# Patient Record
Sex: MALE | Race: WHITE | NOT HISPANIC OR LATINO | URBAN - METROPOLITAN AREA
[De-identification: names, ages, dates, MRNs, and addresses within clinical notes are randomized per-mention and may not be internally consistent; named-entity substitution may affect disease eponyms.]

---

## 2017-02-04 ENCOUNTER — INPATIENT (INPATIENT)
Facility: HOSPITAL | Age: 72
LOS: 3 days | Discharge: HOME HEALTH | End: 2017-02-08
Attending: INTERNAL MEDICINE | Admitting: INTERNAL MEDICINE
Payer: MEDICARE

## 2017-02-04 VITALS
TEMPERATURE: 97 F | WEIGHT: 186.07 LBS | HEART RATE: 162 BPM | RESPIRATION RATE: 20 BRPM | DIASTOLIC BLOOD PRESSURE: 91 MMHG | SYSTOLIC BLOOD PRESSURE: 113 MMHG | OXYGEN SATURATION: 100 % | HEIGHT: 68 IN

## 2017-02-04 DIAGNOSIS — I27.82 CHRONIC PULMONARY EMBOLISM: ICD-10-CM

## 2017-02-04 DIAGNOSIS — I47.1 SUPRAVENTRICULAR TACHYCARDIA: ICD-10-CM

## 2017-02-04 DIAGNOSIS — C18.9 MALIGNANT NEOPLASM OF COLON, UNSPECIFIED: ICD-10-CM

## 2017-02-04 DIAGNOSIS — Z90.49 ACQUIRED ABSENCE OF OTHER SPECIFIED PARTS OF DIGESTIVE TRACT: Chronic | ICD-10-CM

## 2017-02-04 DIAGNOSIS — R73.9 HYPERGLYCEMIA, UNSPECIFIED: ICD-10-CM

## 2017-02-04 DIAGNOSIS — N17.9 ACUTE KIDNEY FAILURE, UNSPECIFIED: ICD-10-CM

## 2017-02-04 DIAGNOSIS — D72.829 ELEVATED WHITE BLOOD CELL COUNT, UNSPECIFIED: ICD-10-CM

## 2017-02-04 LAB
ALBUMIN SERPL ELPH-MCNC: 3.3 G/DL — SIGNIFICANT CHANGE UP (ref 3.3–5)
ALP SERPL-CCNC: 113 U/L — SIGNIFICANT CHANGE UP (ref 40–120)
ALT FLD-CCNC: 46 U/L — SIGNIFICANT CHANGE UP (ref 12–78)
ANION GAP SERPL CALC-SCNC: 12 MMOL/L — SIGNIFICANT CHANGE UP (ref 5–17)
APTT BLD: 32.4 SEC — SIGNIFICANT CHANGE UP (ref 27.5–37.4)
AST SERPL-CCNC: 29 U/L — SIGNIFICANT CHANGE UP (ref 15–37)
BASOPHILS # BLD AUTO: 0.1 K/UL — SIGNIFICANT CHANGE UP (ref 0–0.2)
BASOPHILS NFR BLD AUTO: 0.6 % — SIGNIFICANT CHANGE UP (ref 0–2)
BILIRUB SERPL-MCNC: 0.9 MG/DL — SIGNIFICANT CHANGE UP (ref 0.2–1.2)
BUN SERPL-MCNC: 25 MG/DL — HIGH (ref 7–23)
CALCIUM SERPL-MCNC: 8.6 MG/DL — SIGNIFICANT CHANGE UP (ref 8.5–10.1)
CHLORIDE SERPL-SCNC: 102 MMOL/L — SIGNIFICANT CHANGE UP (ref 96–108)
CK SERPL-CCNC: 104 U/L — SIGNIFICANT CHANGE UP (ref 26–308)
CO2 SERPL-SCNC: 27 MMOL/L — SIGNIFICANT CHANGE UP (ref 22–31)
CREAT SERPL-MCNC: 2.32 MG/DL — HIGH (ref 0.5–1.3)
EOSINOPHIL # BLD AUTO: 0.2 K/UL — SIGNIFICANT CHANGE UP (ref 0–0.5)
EOSINOPHIL NFR BLD AUTO: 0.9 % — SIGNIFICANT CHANGE UP (ref 0–6)
GLUCOSE SERPL-MCNC: 198 MG/DL — HIGH (ref 70–99)
HBA1C BLD-MCNC: 5.6 % — SIGNIFICANT CHANGE UP (ref 4–5.6)
HCT VFR BLD CALC: 34.2 % — LOW (ref 39–50)
HGB BLD-MCNC: 11.1 G/DL — LOW (ref 13–17)
INR BLD: 1.06 RATIO — SIGNIFICANT CHANGE UP (ref 0.88–1.16)
LYMPHOCYTES # BLD AUTO: 24.7 % — SIGNIFICANT CHANGE UP (ref 13–44)
LYMPHOCYTES # BLD AUTO: 4.4 K/UL — HIGH (ref 1–3.3)
MCHC RBC-ENTMCNC: 31.2 PG — SIGNIFICANT CHANGE UP (ref 27–34)
MCHC RBC-ENTMCNC: 32.5 GM/DL — SIGNIFICANT CHANGE UP (ref 32–36)
MCV RBC AUTO: 96 FL — SIGNIFICANT CHANGE UP (ref 80–100)
MONOCYTES # BLD AUTO: 1.4 K/UL — HIGH (ref 0–0.9)
MONOCYTES NFR BLD AUTO: 8 % — SIGNIFICANT CHANGE UP (ref 2–14)
NEUTROPHILS # BLD AUTO: 11.8 K/UL — HIGH (ref 1.8–7.4)
NEUTROPHILS NFR BLD AUTO: 65.8 % — SIGNIFICANT CHANGE UP (ref 43–77)
NT-PROBNP SERPL-SCNC: 572 PG/ML — HIGH (ref 0–125)
PLATELET # BLD AUTO: 273 K/UL — SIGNIFICANT CHANGE UP (ref 150–400)
POTASSIUM SERPL-MCNC: 4.2 MMOL/L — SIGNIFICANT CHANGE UP (ref 3.5–5.3)
POTASSIUM SERPL-SCNC: 4.2 MMOL/L — SIGNIFICANT CHANGE UP (ref 3.5–5.3)
PROT SERPL-MCNC: 8.3 GM/DL — SIGNIFICANT CHANGE UP (ref 6–8.3)
PROTHROM AB SERPL-ACNC: 11.7 SEC — SIGNIFICANT CHANGE UP (ref 10–13.1)
RBC # BLD: 3.56 M/UL — LOW (ref 4.2–5.8)
RBC # FLD: 16 % — HIGH (ref 10.3–14.5)
SODIUM SERPL-SCNC: 141 MMOL/L — SIGNIFICANT CHANGE UP (ref 135–145)
TROPONIN I SERPL-MCNC: 0.14 NG/ML — HIGH (ref 0.01–0.04)
TROPONIN I SERPL-MCNC: 0.22 NG/ML — HIGH (ref 0.01–0.04)
WBC # BLD: 18 K/UL — HIGH (ref 3.8–10.5)
WBC # FLD AUTO: 18 K/UL — HIGH (ref 3.8–10.5)

## 2017-02-04 PROCEDURE — 76700 US EXAM ABDOM COMPLETE: CPT | Mod: 26

## 2017-02-04 PROCEDURE — 99291 CRITICAL CARE FIRST HOUR: CPT

## 2017-02-04 PROCEDURE — 71010: CPT | Mod: 26

## 2017-02-04 PROCEDURE — 93010 ELECTROCARDIOGRAM REPORT: CPT

## 2017-02-04 RX ORDER — ONDANSETRON 8 MG/1
4 TABLET, FILM COATED ORAL EVERY 6 HOURS
Qty: 0 | Refills: 0 | Status: DISCONTINUED | OUTPATIENT
Start: 2017-02-04 | End: 2017-02-08

## 2017-02-04 RX ORDER — ACETAMINOPHEN 500 MG
650 TABLET ORAL EVERY 6 HOURS
Qty: 0 | Refills: 0 | Status: DISCONTINUED | OUTPATIENT
Start: 2017-02-04 | End: 2017-02-08

## 2017-02-04 RX ORDER — SODIUM CHLORIDE 9 MG/ML
1000 INJECTION INTRAMUSCULAR; INTRAVENOUS; SUBCUTANEOUS
Qty: 0 | Refills: 0 | Status: DISCONTINUED | OUTPATIENT
Start: 2017-02-04 | End: 2017-02-07

## 2017-02-04 RX ORDER — SIMVASTATIN 20 MG/1
20 TABLET, FILM COATED ORAL AT BEDTIME
Qty: 0 | Refills: 0 | Status: DISCONTINUED | OUTPATIENT
Start: 2017-02-04 | End: 2017-02-08

## 2017-02-04 RX ORDER — DEXTROAMPHETAMINE SACCHARATE, AMPHETAMINE ASPARTATE, DEXTROAMPHETAMINE SULFATE AND AMPHETAMINE SULFATE 1.875; 1.875; 1.875; 1.875 MG/1; MG/1; MG/1; MG/1
5 TABLET ORAL DAILY
Qty: 0 | Refills: 0 | Status: DISCONTINUED | OUTPATIENT
Start: 2017-02-04 | End: 2017-02-08

## 2017-02-04 RX ORDER — METOPROLOL TARTRATE 50 MG
25 TABLET ORAL DAILY
Qty: 0 | Refills: 0 | Status: DISCONTINUED | OUTPATIENT
Start: 2017-02-04 | End: 2017-02-04

## 2017-02-04 RX ORDER — DEXTROSE 50 % IN WATER 50 %
25 SYRINGE (ML) INTRAVENOUS ONCE
Qty: 0 | Refills: 0 | Status: DISCONTINUED | OUTPATIENT
Start: 2017-02-04 | End: 2017-02-05

## 2017-02-04 RX ORDER — INSULIN LISPRO 100/ML
VIAL (ML) SUBCUTANEOUS
Qty: 0 | Refills: 0 | Status: DISCONTINUED | OUTPATIENT
Start: 2017-02-04 | End: 2017-02-05

## 2017-02-04 RX ORDER — ENOXAPARIN SODIUM 100 MG/ML
130 INJECTION SUBCUTANEOUS ONCE
Qty: 0 | Refills: 0 | Status: COMPLETED | OUTPATIENT
Start: 2017-02-04 | End: 2017-02-04

## 2017-02-04 RX ORDER — METOPROLOL TARTRATE 50 MG
50 TABLET ORAL DAILY
Qty: 0 | Refills: 0 | Status: DISCONTINUED | OUTPATIENT
Start: 2017-02-04 | End: 2017-02-04

## 2017-02-04 RX ORDER — SODIUM CHLORIDE 9 MG/ML
3 INJECTION INTRAMUSCULAR; INTRAVENOUS; SUBCUTANEOUS ONCE
Qty: 0 | Refills: 0 | Status: COMPLETED | OUTPATIENT
Start: 2017-02-04 | End: 2017-02-04

## 2017-02-04 RX ORDER — DOCUSATE SODIUM 100 MG
100 CAPSULE ORAL THREE TIMES A DAY
Qty: 0 | Refills: 0 | Status: DISCONTINUED | OUTPATIENT
Start: 2017-02-04 | End: 2017-02-08

## 2017-02-04 RX ORDER — DEXTROSE 50 % IN WATER 50 %
1 SYRINGE (ML) INTRAVENOUS ONCE
Qty: 0 | Refills: 0 | Status: DISCONTINUED | OUTPATIENT
Start: 2017-02-04 | End: 2017-02-08

## 2017-02-04 RX ORDER — SODIUM CHLORIDE 9 MG/ML
1000 INJECTION, SOLUTION INTRAVENOUS
Qty: 0 | Refills: 0 | Status: DISCONTINUED | OUTPATIENT
Start: 2017-02-04 | End: 2017-02-05

## 2017-02-04 RX ORDER — GLUCAGON INJECTION, SOLUTION 0.5 MG/.1ML
1 INJECTION, SOLUTION SUBCUTANEOUS ONCE
Qty: 0 | Refills: 0 | Status: DISCONTINUED | OUTPATIENT
Start: 2017-02-04 | End: 2017-02-05

## 2017-02-04 RX ORDER — ASPIRIN/CALCIUM CARB/MAGNESIUM 324 MG
325 TABLET ORAL ONCE
Qty: 0 | Refills: 0 | Status: COMPLETED | OUTPATIENT
Start: 2017-02-04 | End: 2017-02-04

## 2017-02-04 RX ORDER — ENOXAPARIN SODIUM 100 MG/ML
80 INJECTION SUBCUTANEOUS EVERY 12 HOURS
Qty: 0 | Refills: 0 | Status: DISCONTINUED | OUTPATIENT
Start: 2017-02-04 | End: 2017-02-06

## 2017-02-04 RX ORDER — ADENOSINE 3 MG/ML
6 INJECTION INTRAVENOUS ONCE
Qty: 0 | Refills: 0 | Status: COMPLETED | OUTPATIENT
Start: 2017-02-04 | End: 2017-02-04

## 2017-02-04 RX ORDER — SENNA PLUS 8.6 MG/1
2 TABLET ORAL AT BEDTIME
Qty: 0 | Refills: 0 | Status: DISCONTINUED | OUTPATIENT
Start: 2017-02-04 | End: 2017-02-08

## 2017-02-04 RX ORDER — DEXTROSE 50 % IN WATER 50 %
12.5 SYRINGE (ML) INTRAVENOUS ONCE
Qty: 0 | Refills: 0 | Status: DISCONTINUED | OUTPATIENT
Start: 2017-02-04 | End: 2017-02-05

## 2017-02-04 RX ORDER — LATANOPROST 0.05 MG/ML
1 SOLUTION/ DROPS OPHTHALMIC; TOPICAL AT BEDTIME
Qty: 0 | Refills: 0 | Status: DISCONTINUED | OUTPATIENT
Start: 2017-02-04 | End: 2017-02-08

## 2017-02-04 RX ORDER — METOPROLOL TARTRATE 50 MG
25 TABLET ORAL DAILY
Qty: 0 | Refills: 0 | Status: DISCONTINUED | OUTPATIENT
Start: 2017-02-04 | End: 2017-02-05

## 2017-02-04 RX ADMIN — LATANOPROST 1 DROP(S): 0.05 SOLUTION/ DROPS OPHTHALMIC; TOPICAL at 23:05

## 2017-02-04 RX ADMIN — SIMVASTATIN 20 MILLIGRAM(S): 20 TABLET, FILM COATED ORAL at 23:05

## 2017-02-04 RX ADMIN — SODIUM CHLORIDE 3 MILLILITER(S): 9 INJECTION INTRAMUSCULAR; INTRAVENOUS; SUBCUTANEOUS at 14:49

## 2017-02-04 RX ADMIN — SODIUM CHLORIDE 80 MILLILITER(S): 9 INJECTION INTRAMUSCULAR; INTRAVENOUS; SUBCUTANEOUS at 22:09

## 2017-02-04 RX ADMIN — ENOXAPARIN SODIUM 130 MILLIGRAM(S): 100 INJECTION SUBCUTANEOUS at 14:15

## 2017-02-04 RX ADMIN — ADENOSINE 6 MILLIGRAM(S): 3 INJECTION INTRAVENOUS at 11:51

## 2017-02-04 RX ADMIN — Medication 325 MILLIGRAM(S): at 14:50

## 2017-02-04 NOTE — H&P ADULT - HISTORY OF PRESENT ILLNESS
72 y/o M PMHx colon ca, on chemo, Afib was on heart monitor for 1 month and was on metoprolol, PE  one month ago, presents to the ED c/o arms pain and dizziness when he tried to stand up. When he sat down the symptoms improved.  Alarmed, the wife summoned him to come to ER; here he was found in PSVT and responded well to adenosisne. Has evidence of ELIANA and he is on LMWH for VTE. Will be adm to tele. His psvt is probab sec to dehydration that caused RF; r/o obstruction ( less likely, normal micturition no abd pain).  Pt has a h/o stg IV colon ca s/p 2 resections and he is on chemo, last one 3 weeks ago. He is also in clinical trial with a " virus infusion"- Reolysin

## 2017-02-04 NOTE — ED ADULT NURSE NOTE - PMH
Colon cancer    Glaucoma of both eyes, unspecified glaucoma    HTN (hypertension)    Hypercholesterolemia

## 2017-02-04 NOTE — H&P ADULT - PROBLEM SELECTOR PLAN 1
- resolved with adenosine; probab sec to dehydration, baseline cr is 0.8 per daughter  - ivf and tele; c/w BB

## 2017-02-04 NOTE — ED PROVIDER NOTE - CARE PLAN
Principal Discharge DX:	SVT (supraventricular tachycardia)  Secondary Diagnosis:	Elevated troponin I level

## 2017-02-04 NOTE — H&P ADULT - NSHPPHYSICALEXAM_GEN_ALL_CORE
PHYSICAL EXAM:      Constitutional: well    ENMT: NC/AT PERRLA    Neck:supple no jvd    Breasts:deferred      Respiratory:CTA    Cardiovascular:S1S2 REG NO M/G/R    Gastrointestinal:SOFT, VENTRAL HERNIA     Genitourinary: DEFERRED    Rectal:DEFERRED    Extremities: NO EDEMA CLUBBING OR CYANOSIS    Neurological:MOT STR 5/5 ALL EXTR    Skin:DRY

## 2017-02-04 NOTE — ED PROVIDER NOTE - CRITICAL CARE PROVIDED
consult w/ pt's family directly relating to pts condition/documentation/direct patient care (not related to procedure)/additional history taking/interpretation of diagnostic studies

## 2017-02-04 NOTE — ED PROVIDER NOTE - PROGRESS NOTE DETAILS
The pt was given an adenosine push at 11:53, pt tolerated procedure well. HR and EKG were monitored. The pt was given an adenosine push at 11:53, pt tolerated procedure well. HR and EKG were monitored. Pt returned to NSR.

## 2017-02-04 NOTE — H&P ADULT - ASSESSMENT
72 y/o M PMHx colon ca, on chemo, Afib was on heart monitor for 1 month and was on metoprolol, PE  one month ago, presents to the ED c/o arms pain and dizziness when he tried to stand up

## 2017-02-04 NOTE — ED ADULT NURSE REASSESSMENT NOTE - NS ED NURSE REASSESS COMMENT FT1
Floor called for report and unable to take patient at time ([id number]). RN states she will call back.
Pt received from prior RN alert and oriented in sinus rhythm with report of feeling SOB and weak when standing. Pt already admin adenosine for SVT. Pt denies any chest pain, n/v, or pain at this time. Pt reports that at time of SOB/weakness he felt upper shoulder pain that has since resolved. Pt cont to be monitored and awaiting further dispo at this time.
Troponin 0.135
pt received. alert and oriented. no signs of respiratory distress. breathing even and unlabored. denies chest pain, SOB, nausea, vomiting at this time. resting comfortably. wife at bedside. will monitor.

## 2017-02-04 NOTE — H&P ADULT - NSHPLABSRESULTS_GEN_ALL_CORE
11.1   18.0  )-----------( 273      ( 04 Feb 2017 11:42 )             34.2   04 Feb 2017 11:42    141    |  102    |  25     ----------------------------<  198    4.2     |  27     |  2.32     Ca    8.6        04 Feb 2017 11:42    TPro  8.3    /  Alb  3.3    /  TBili  0.9    /  DBili  x      /  AST  29     /  ALT  46     /  AlkPhos  113    04 Feb 2017 11:42

## 2017-02-04 NOTE — ED PROVIDER NOTE - DETAILS:
I, Naomie Lockett, performed the initial face to face bedside interview with this patient regarding history of present illness, review of symptoms and relevant past medical, social and family history.  I completed an independent physical examination.  I was the initial provider who evaluated this patient. The history, relevant review of systems, past medical and surgical history, medical decision making, and physical examination was documented by the scribe in my presence and I attest to the accuracy of the documentation.

## 2017-02-04 NOTE — ED PROVIDER NOTE - OBJECTIVE STATEMENT
70 y/o M PMHx colon ca, on chemo, Afibb was on heart monitor for 1 month and was on metoprolol, presents to the ED c/o SOB. The pt provides the sob started 1 day ago. Pt took 2 81mg asa prior to coming to ED. PMD Dr. Valdovinos. No h/o cp, abd pain, nvd, fever, chills, dizziness or urinary incontinence. 72 y/o M PMHx colon ca, on chemo, Afibb was on heart monitor for 1 month and was on metoprolol, PE  one month ago, presents to the ED c/o SOB. The pt provides the sob started 1 day ago. Pt took 2 81mg asa prior to coming to ED. PMD Dr. Valdovinos. No h/o cp, abd pain, nvd, fever, chills, dizziness or urinary incontinence.

## 2017-02-04 NOTE — PATIENT PROFILE ADULT. - PMH
Colon cancer    Glaucoma of both eyes, unspecified glaucoma    HTN (hypertension)    Hypercholesterolemia    Other chronic pulmonary embolism

## 2017-02-05 DIAGNOSIS — I48.92 UNSPECIFIED ATRIAL FLUTTER: ICD-10-CM

## 2017-02-05 DIAGNOSIS — R79.89 OTHER SPECIFIED ABNORMAL FINDINGS OF BLOOD CHEMISTRY: ICD-10-CM

## 2017-02-05 LAB
ANION GAP SERPL CALC-SCNC: 7 MMOL/L — SIGNIFICANT CHANGE UP (ref 5–17)
BASOPHILS # BLD AUTO: 0.1 K/UL — SIGNIFICANT CHANGE UP (ref 0–0.2)
BASOPHILS NFR BLD AUTO: 0.4 % — SIGNIFICANT CHANGE UP (ref 0–2)
BUN SERPL-MCNC: 24 MG/DL — HIGH (ref 7–23)
CALCIUM SERPL-MCNC: 8.4 MG/DL — LOW (ref 8.5–10.1)
CHLORIDE SERPL-SCNC: 105 MMOL/L — SIGNIFICANT CHANGE UP (ref 96–108)
CO2 SERPL-SCNC: 29 MMOL/L — SIGNIFICANT CHANGE UP (ref 22–31)
CREAT SERPL-MCNC: 1.85 MG/DL — HIGH (ref 0.5–1.3)
EOSINOPHIL # BLD AUTO: 0.1 K/UL — SIGNIFICANT CHANGE UP (ref 0–0.5)
EOSINOPHIL NFR BLD AUTO: 0.8 % — SIGNIFICANT CHANGE UP (ref 0–6)
GLUCOSE SERPL-MCNC: 108 MG/DL — HIGH (ref 70–99)
HCT VFR BLD CALC: 27.7 % — LOW (ref 39–50)
HGB BLD-MCNC: 9.1 G/DL — LOW (ref 13–17)
LYMPHOCYTES # BLD AUTO: 13.4 % — SIGNIFICANT CHANGE UP (ref 13–44)
LYMPHOCYTES # BLD AUTO: 2.3 K/UL — SIGNIFICANT CHANGE UP (ref 1–3.3)
MCHC RBC-ENTMCNC: 31.4 PG — SIGNIFICANT CHANGE UP (ref 27–34)
MCHC RBC-ENTMCNC: 33 GM/DL — SIGNIFICANT CHANGE UP (ref 32–36)
MCV RBC AUTO: 95.1 FL — SIGNIFICANT CHANGE UP (ref 80–100)
MONOCYTES # BLD AUTO: 1.2 K/UL — HIGH (ref 0–0.9)
MONOCYTES NFR BLD AUTO: 7.1 % — SIGNIFICANT CHANGE UP (ref 2–14)
NEUTROPHILS # BLD AUTO: 13.4 K/UL — HIGH (ref 1.8–7.4)
NEUTROPHILS NFR BLD AUTO: 78.3 % — HIGH (ref 43–77)
PLATELET # BLD AUTO: 187 K/UL — SIGNIFICANT CHANGE UP (ref 150–400)
POTASSIUM SERPL-MCNC: 4.8 MMOL/L — SIGNIFICANT CHANGE UP (ref 3.5–5.3)
POTASSIUM SERPL-SCNC: 4.8 MMOL/L — SIGNIFICANT CHANGE UP (ref 3.5–5.3)
RBC # BLD: 2.91 M/UL — LOW (ref 4.2–5.8)
RBC # FLD: 15.8 % — HIGH (ref 10.3–14.5)
SODIUM SERPL-SCNC: 141 MMOL/L — SIGNIFICANT CHANGE UP (ref 135–145)
WBC # BLD: 17.1 K/UL — HIGH (ref 3.8–10.5)
WBC # FLD AUTO: 17.1 K/UL — HIGH (ref 3.8–10.5)

## 2017-02-05 PROCEDURE — 99223 1ST HOSP IP/OBS HIGH 75: CPT

## 2017-02-05 PROCEDURE — 93010 ELECTROCARDIOGRAM REPORT: CPT

## 2017-02-05 RX ORDER — AMIODARONE HYDROCHLORIDE 400 MG/1
400 TABLET ORAL ONCE
Qty: 0 | Refills: 0 | Status: COMPLETED | OUTPATIENT
Start: 2017-02-05 | End: 2017-02-05

## 2017-02-05 RX ORDER — AMIODARONE HYDROCHLORIDE 400 MG/1
400 TABLET ORAL EVERY 12 HOURS
Qty: 0 | Refills: 0 | Status: DISCONTINUED | OUTPATIENT
Start: 2017-02-05 | End: 2017-02-08

## 2017-02-05 RX ORDER — OXYCODONE HYDROCHLORIDE 5 MG/1
5 TABLET ORAL EVERY 4 HOURS
Qty: 0 | Refills: 0 | Status: DISCONTINUED | OUTPATIENT
Start: 2017-02-05 | End: 2017-02-08

## 2017-02-05 RX ADMIN — Medication 100 MILLIGRAM(S): at 21:04

## 2017-02-05 RX ADMIN — SODIUM CHLORIDE 80 MILLILITER(S): 9 INJECTION INTRAMUSCULAR; INTRAVENOUS; SUBCUTANEOUS at 18:14

## 2017-02-05 RX ADMIN — OXYCODONE HYDROCHLORIDE 5 MILLIGRAM(S): 5 TABLET ORAL at 10:41

## 2017-02-05 RX ADMIN — ENOXAPARIN SODIUM 80 MILLIGRAM(S): 100 INJECTION SUBCUTANEOUS at 05:00

## 2017-02-05 RX ADMIN — SIMVASTATIN 20 MILLIGRAM(S): 20 TABLET, FILM COATED ORAL at 21:04

## 2017-02-05 RX ADMIN — OXYCODONE HYDROCHLORIDE 5 MILLIGRAM(S): 5 TABLET ORAL at 22:15

## 2017-02-05 RX ADMIN — Medication 25 MILLIGRAM(S): at 05:01

## 2017-02-05 RX ADMIN — OXYCODONE HYDROCHLORIDE 5 MILLIGRAM(S): 5 TABLET ORAL at 15:28

## 2017-02-05 RX ADMIN — OXYCODONE HYDROCHLORIDE 5 MILLIGRAM(S): 5 TABLET ORAL at 21:03

## 2017-02-05 RX ADMIN — ENOXAPARIN SODIUM 80 MILLIGRAM(S): 100 INJECTION SUBCUTANEOUS at 18:14

## 2017-02-05 RX ADMIN — Medication 100 MILLIGRAM(S): at 13:11

## 2017-02-05 RX ADMIN — OXYCODONE HYDROCHLORIDE 5 MILLIGRAM(S): 5 TABLET ORAL at 11:22

## 2017-02-05 RX ADMIN — AMIODARONE HYDROCHLORIDE 400 MILLIGRAM(S): 400 TABLET ORAL at 15:29

## 2017-02-05 RX ADMIN — AMIODARONE HYDROCHLORIDE 400 MILLIGRAM(S): 400 TABLET ORAL at 21:04

## 2017-02-05 RX ADMIN — LATANOPROST 1 DROP(S): 0.05 SOLUTION/ DROPS OPHTHALMIC; TOPICAL at 21:10

## 2017-02-05 NOTE — CONSULT NOTE ADULT - SUBJECTIVE AND OBJECTIVE BOX
REQUESTING PHYSICIAN: Dr. Kang    REASON FOR CONSULT : Atrial fibrillation    CHIEF COMPLAINT: Fatigue and generalized weakness    HPI:  72 y/o M PMHx metastatic colon cancer (lung, liver) receiving chemotherapy ([facility] trial), perioperative atrial fibrillation several years ago with no known recurrence, recent PE & DVT (now anticoagulated with Lovenox) who presented to the ER yesterday with generalized weakness, fatigue, decreased functional status, dizziness, and bilateral shoulder pain. He was tachycardic upon arrival and treated with adenosine for suspected SVT.  He was also noted to have elevated BUN/Cr and was diagnosed with dehydration and administered IVF.  He has had multiple recurrences of AF/AFL since admission.  He denies: palpitations, angina, orthopnea, dyspnea.  Recent fall ~6 days ago; felt lightheaded and near-syncopal prior to admission.  Unable to identify exacerbating / alleviating factors.    PAST MEDICAL & SURGICAL HISTORY:  Other chronic pulmonary embolism  Colon cancer  HTN (hypertension)  Hypercholesterolemia  Glaucoma of both eyes, unspecified glaucoma  History of colon resection    SOCIAL HISTORY: Alochol: Denied  Smoking: Nonsmoker  Drug Use: Denied  Marital Status:     FAMILY HISTORY: FAMILY HISTORY:  No pertinent family history in first degree relatives    MEDICATIONS  (STANDING):  sodium chloride 0.9%. 1000milliLiter(s) IV Continuous <Continuous>  docusate sodium 100milliGRAM(s) Oral three times a day  enoxaparin Injectable 80milliGRAM(s) SubCutaneous every 12 hours  simvastatin 20milliGRAM(s) Oral at bedtime  amphetamine/dextroamphetamine 5milliGRAM(s) Oral daily  latanoprost 0.005% Ophthalmic Solution 1Drop(s) Both EYES at bedtime  amiodarone    Tablet 400milliGRAM(s) Oral once  amiodarone    Tablet 400milliGRAM(s) Oral every 12 hours    MEDICATIONS  (PRN):  acetaminophen   Tablet 650milliGRAM(s) Oral every 6 hours PRN pain,fever  ondansetron Injectable 4milliGRAM(s) IV Push every 6 hours PRN Nausea  aluminum hydroxide/magnesium hydroxide/simethicone Suspension 30milliLiter(s) Oral every 4 hours PRN Dyspepsia  senna 2Tablet(s) Oral at bedtime PRN Constipation  dextrose Gel 1Dose(s) Oral once PRN Blood Glucose LESS THAN 70 milliGRAM(s)/deciliter  oxyCODONE IR 5milliGRAM(s) Oral every 4 hours PRN Moderate Pain (4 - 6)      ALLERGIES: penicillins (Rash)    REVIEW OF SYSTEMS:    CONSTITUTIONAL: Weakness, no fevers or chills  EYES/ENT: No visual changes;  No vertigo or throat pain   NECK: No pain or stiffness  RESPIRATORY: No cough, wheezing, hemoptysis; No shortness of breath  CARDIOVASCULAR: No chest pain or palpitations  GASTROINTESTINAL: No abdominal pain. No nausea, vomiting, or hematemesis; No diarrhea or constipation. No melena or hematochezia.  GENITOURINARY: No dysuria, frequency or hematuria  NEUROLOGICAL: Dizziness, lightheadedness. No numbness or weakness  SKIN: No itching or rash  All other review of systems is negative unless indicated above    VITAL SIGNS:   Vital Signs Last 24 Hrs  T(C): 37.1, Max: 37.1 (02-05 @ 11:31)  T(F): 98.8, Max: 98.8 (02-05 @ 11:31)  HR: 73 (73 - 85)  BP: 135/85 (127/83 - 150/80)  BP(mean): --  RR: 17 (17 - 18)  SpO2: 98% (98% - 100%)    PHYSICAL EXAM:    Constitutional: NAD, awake and alert, well-developed. Seated in bedside chair.  Eyes:  EOMI,  Pupils round,   Pulmonary: Non-labored, breath sounds are clear bilaterally, No wheezing, rales or rhonchi  Cardiovascular: Irregular, tachycardic. Normal S2.  Gastrointestinal: Abdomen is obese. Bowel Sounds present, soft, nontender.   Lymph: No peripheral edema. No cervical lymphadenopathy.  Neurological: Alert, no focal deficits  Skin: No rashes.  Psych:  Mood & affect appropriate    LABS: All Labs Reviewed:                        9.1    17.1  )-----------( 187      ( 05 Feb 2017 05:46 )             27.7                         11.1   18.0  )-----------( 273      ( 04 Feb 2017 11:42 )             34.2     05 Feb 2017 05:46    141    |  105    |  24     ----------------------------<  108    4.8     |  29     |  1.85   04 Feb 2017 11:42    141    |  102    |  25     ----------------------------<  198    4.2     |  27     |  2.32     Ca    8.4        05 Feb 2017 05:46  Ca    8.6        04 Feb 2017 11:42    TPro  8.3    /  Alb  3.3    /  TBili  0.9    /  DBili  x      /  AST  29     /  ALT  46     /  AlkPhos  113    04 Feb 2017 11:42    PT/INR - ( 04 Feb 2017 11:42 )   PT: 11.7 sec;   INR: 1.06 ratio         PTT - ( 04 Feb 2017 11:42 )  PTT:32.4 sec  CARDIAC MARKERS ( 04 Feb 2017 17:09 )  0.221 ng/mL / x     / x     / x     / x      CARDIAC MARKERS ( 04 Feb 2017 11:42 )  0.135 ng/mL / x     / 104 U/L / x     / x        Pro Bnp 572    EKG (multiple ECGs reviewed - most recent): Normal sinus rhythm, Left axis deviation early R wave transition. Nonspecific ST abnormality

## 2017-02-05 NOTE — CONSULT NOTE ADULT - PROBLEM SELECTOR RECOMMENDATION 9
Paroxysmal atrial fluter / fibrillation with multiple recurrences associated with rapid ventricular response.  I discussed management with his outpatient electrophysiologist (Dr. William Beauchamp) - will load with oral amiodarone in effort to maintain sinus rhythm (I discussed indications, benefits, risks [including toxicity], alternatives with patient and wife).  There is a history of sinus bradycardia so will d/c metoprolol.

## 2017-02-06 LAB
ABO RH CONFIRMATION: SIGNIFICANT CHANGE UP
ANION GAP SERPL CALC-SCNC: 8 MMOL/L — SIGNIFICANT CHANGE UP (ref 5–17)
ANISOCYTOSIS BLD QL: SLIGHT — SIGNIFICANT CHANGE UP
BASOPHILS # BLD AUTO: 0.1 K/UL — SIGNIFICANT CHANGE UP (ref 0–0.2)
BASOPHILS NFR BLD AUTO: 0.6 % — SIGNIFICANT CHANGE UP (ref 0–2)
BLD GP AB SCN SERPL QL: SIGNIFICANT CHANGE UP
BUN SERPL-MCNC: 20 MG/DL — SIGNIFICANT CHANGE UP (ref 7–23)
CALCIUM SERPL-MCNC: 8.3 MG/DL — LOW (ref 8.5–10.1)
CHLORIDE SERPL-SCNC: 105 MMOL/L — SIGNIFICANT CHANGE UP (ref 96–108)
CO2 SERPL-SCNC: 27 MMOL/L — SIGNIFICANT CHANGE UP (ref 22–31)
CREAT SERPL-MCNC: 1.73 MG/DL — HIGH (ref 0.5–1.3)
DACRYOCYTES BLD QL SMEAR: SLIGHT — SIGNIFICANT CHANGE UP
EOSINOPHIL # BLD AUTO: 0 K/UL — SIGNIFICANT CHANGE UP (ref 0–0.5)
EOSINOPHIL NFR BLD AUTO: 0.4 % — SIGNIFICANT CHANGE UP (ref 0–6)
GLUCOSE SERPL-MCNC: 100 MG/DL — HIGH (ref 70–99)
HCT VFR BLD CALC: 23.6 % — LOW (ref 39–50)
HGB BLD-MCNC: 7.9 G/DL — LOW (ref 13–17)
HYPOCHROMIA BLD QL: SLIGHT — SIGNIFICANT CHANGE UP
LYMPHOCYTES # BLD AUTO: 16 % — SIGNIFICANT CHANGE UP (ref 13–44)
LYMPHOCYTES # BLD AUTO: 2.1 K/UL — SIGNIFICANT CHANGE UP (ref 1–3.3)
MACROCYTES BLD QL: SLIGHT — SIGNIFICANT CHANGE UP
MANUAL DIF COMMENT BLD-IMP: SIGNIFICANT CHANGE UP
MCHC RBC-ENTMCNC: 31.6 PG — SIGNIFICANT CHANGE UP (ref 27–34)
MCHC RBC-ENTMCNC: 33.3 GM/DL — SIGNIFICANT CHANGE UP (ref 32–36)
MCV RBC AUTO: 95 FL — SIGNIFICANT CHANGE UP (ref 80–100)
MICROCYTES BLD QL: SLIGHT — SIGNIFICANT CHANGE UP
MONOCYTES # BLD AUTO: 1.4 K/UL — HIGH (ref 0–0.9)
MONOCYTES NFR BLD AUTO: 10.4 % — SIGNIFICANT CHANGE UP (ref 2–14)
NEUTROPHILS # BLD AUTO: 9.4 K/UL — HIGH (ref 1.8–7.4)
NEUTROPHILS NFR BLD AUTO: 72.7 % — SIGNIFICANT CHANGE UP (ref 43–77)
PLAT MORPH BLD: NORMAL — SIGNIFICANT CHANGE UP
PLATELET # BLD AUTO: 148 K/UL — LOW (ref 150–400)
POIKILOCYTOSIS BLD QL AUTO: SLIGHT — SIGNIFICANT CHANGE UP
POTASSIUM SERPL-MCNC: 4.6 MMOL/L — SIGNIFICANT CHANGE UP (ref 3.5–5.3)
POTASSIUM SERPL-SCNC: 4.6 MMOL/L — SIGNIFICANT CHANGE UP (ref 3.5–5.3)
RBC # BLD: 2.49 M/UL — LOW (ref 4.2–5.8)
RBC # FLD: 16.3 % — HIGH (ref 10.3–14.5)
RBC BLD AUTO: (no result)
SODIUM SERPL-SCNC: 140 MMOL/L — SIGNIFICANT CHANGE UP (ref 135–145)
SPHEROCYTES BLD QL SMEAR: SLIGHT — SIGNIFICANT CHANGE UP
TYPE + AB SCN PNL BLD: SIGNIFICANT CHANGE UP
WBC # BLD: 13 K/UL — HIGH (ref 3.8–10.5)
WBC # FLD AUTO: 13 K/UL — HIGH (ref 3.8–10.5)

## 2017-02-06 PROCEDURE — 73502 X-RAY EXAM HIP UNI 2-3 VIEWS: CPT | Mod: 26

## 2017-02-06 PROCEDURE — 37191 INS ENDOVAS VENA CAVA FILTR: CPT

## 2017-02-06 PROCEDURE — 76377 3D RENDER W/INTRP POSTPROCES: CPT | Mod: 26

## 2017-02-06 PROCEDURE — 72192 CT PELVIS W/O DYE: CPT | Mod: 26

## 2017-02-06 PROCEDURE — 99233 SBSQ HOSP IP/OBS HIGH 50: CPT

## 2017-02-06 RX ORDER — MORPHINE SULFATE 50 MG/1
4 CAPSULE, EXTENDED RELEASE ORAL EVERY 4 HOURS
Qty: 0 | Refills: 0 | Status: DISCONTINUED | OUTPATIENT
Start: 2017-02-06 | End: 2017-02-08

## 2017-02-06 RX ADMIN — MORPHINE SULFATE 4 MILLIGRAM(S): 50 CAPSULE, EXTENDED RELEASE ORAL at 20:36

## 2017-02-06 RX ADMIN — SIMVASTATIN 20 MILLIGRAM(S): 20 TABLET, FILM COATED ORAL at 21:07

## 2017-02-06 RX ADMIN — ENOXAPARIN SODIUM 80 MILLIGRAM(S): 100 INJECTION SUBCUTANEOUS at 06:06

## 2017-02-06 RX ADMIN — MORPHINE SULFATE 4 MILLIGRAM(S): 50 CAPSULE, EXTENDED RELEASE ORAL at 20:55

## 2017-02-06 RX ADMIN — MORPHINE SULFATE 4 MILLIGRAM(S): 50 CAPSULE, EXTENDED RELEASE ORAL at 15:36

## 2017-02-06 RX ADMIN — DEXTROAMPHETAMINE SACCHARATE, AMPHETAMINE ASPARTATE, DEXTROAMPHETAMINE SULFATE AND AMPHETAMINE SULFATE 5 MILLIGRAM(S): 1.875; 1.875; 1.875; 1.875 TABLET ORAL at 12:11

## 2017-02-06 RX ADMIN — Medication 100 MILLIGRAM(S): at 06:06

## 2017-02-06 RX ADMIN — LATANOPROST 1 DROP(S): 0.05 SOLUTION/ DROPS OPHTHALMIC; TOPICAL at 21:07

## 2017-02-06 RX ADMIN — OXYCODONE HYDROCHLORIDE 5 MILLIGRAM(S): 5 TABLET ORAL at 14:35

## 2017-02-06 RX ADMIN — AMIODARONE HYDROCHLORIDE 400 MILLIGRAM(S): 400 TABLET ORAL at 18:12

## 2017-02-06 RX ADMIN — OXYCODONE HYDROCHLORIDE 5 MILLIGRAM(S): 5 TABLET ORAL at 06:16

## 2017-02-06 RX ADMIN — AMIODARONE HYDROCHLORIDE 400 MILLIGRAM(S): 400 TABLET ORAL at 06:06

## 2017-02-07 DIAGNOSIS — S80.12XA CONTUSION OF LEFT LOWER LEG, INITIAL ENCOUNTER: ICD-10-CM

## 2017-02-07 DIAGNOSIS — I10 ESSENTIAL (PRIMARY) HYPERTENSION: ICD-10-CM

## 2017-02-07 DIAGNOSIS — R79.89 OTHER SPECIFIED ABNORMAL FINDINGS OF BLOOD CHEMISTRY: ICD-10-CM

## 2017-02-07 LAB
HBA1C BLD-MCNC: 5.5 % — SIGNIFICANT CHANGE UP (ref 4–5.6)
HCT VFR BLD CALC: 25.8 % — LOW (ref 39–50)
HCT VFR BLD CALC: 26 % — LOW (ref 39–50)
HGB BLD-MCNC: 8.5 G/DL — LOW (ref 13–17)
HGB BLD-MCNC: 8.8 G/DL — LOW (ref 13–17)
MCHC RBC-ENTMCNC: 31.2 PG — SIGNIFICANT CHANGE UP (ref 27–34)
MCHC RBC-ENTMCNC: 33.1 GM/DL — SIGNIFICANT CHANGE UP (ref 32–36)
MCV RBC AUTO: 94.3 FL — SIGNIFICANT CHANGE UP (ref 80–100)
PLATELET # BLD AUTO: 134 K/UL — LOW (ref 150–400)
RBC # BLD: 2.74 M/UL — LOW (ref 4.2–5.8)
RBC # FLD: 16 % — HIGH (ref 10.3–14.5)
WBC # BLD: 12.8 K/UL — HIGH (ref 3.8–10.5)
WBC # FLD AUTO: 12.8 K/UL — HIGH (ref 3.8–10.5)

## 2017-02-07 PROCEDURE — 99233 SBSQ HOSP IP/OBS HIGH 50: CPT

## 2017-02-07 RX ADMIN — MORPHINE SULFATE 4 MILLIGRAM(S): 50 CAPSULE, EXTENDED RELEASE ORAL at 21:14

## 2017-02-07 RX ADMIN — MORPHINE SULFATE 4 MILLIGRAM(S): 50 CAPSULE, EXTENDED RELEASE ORAL at 13:29

## 2017-02-07 RX ADMIN — MORPHINE SULFATE 4 MILLIGRAM(S): 50 CAPSULE, EXTENDED RELEASE ORAL at 09:07

## 2017-02-07 RX ADMIN — AMIODARONE HYDROCHLORIDE 400 MILLIGRAM(S): 400 TABLET ORAL at 05:05

## 2017-02-07 RX ADMIN — Medication 100 MILLIGRAM(S): at 13:29

## 2017-02-07 RX ADMIN — MORPHINE SULFATE 4 MILLIGRAM(S): 50 CAPSULE, EXTENDED RELEASE ORAL at 16:53

## 2017-02-07 RX ADMIN — DEXTROAMPHETAMINE SACCHARATE, AMPHETAMINE ASPARTATE, DEXTROAMPHETAMINE SULFATE AND AMPHETAMINE SULFATE 5 MILLIGRAM(S): 1.875; 1.875; 1.875; 1.875 TABLET ORAL at 13:29

## 2017-02-07 RX ADMIN — AMIODARONE HYDROCHLORIDE 400 MILLIGRAM(S): 400 TABLET ORAL at 17:33

## 2017-02-07 RX ADMIN — MORPHINE SULFATE 4 MILLIGRAM(S): 50 CAPSULE, EXTENDED RELEASE ORAL at 13:15

## 2017-02-07 RX ADMIN — SIMVASTATIN 20 MILLIGRAM(S): 20 TABLET, FILM COATED ORAL at 21:04

## 2017-02-07 RX ADMIN — LATANOPROST 1 DROP(S): 0.05 SOLUTION/ DROPS OPHTHALMIC; TOPICAL at 21:04

## 2017-02-07 RX ADMIN — Medication 100 MILLIGRAM(S): at 21:04

## 2017-02-07 NOTE — DISCHARGE NOTE ADULT - CARE PLAN
Principal Discharge DX:	SVT (supraventricular tachycardia)  Goal:	medical management.  Instructions for follow-up, activity and diet:	Follow up with your EP doctor on Friday.  Continue amiodarone as prescribed until then.  Continue metoprolol 25mg daily as well.  Secondary Diagnosis:	ELIANA (acute kidney injury)  Goal:	improving.  Instructions for follow-up, activity and diet:	Encourage oral hydration.  follow up with your pcp.  Secondary Diagnosis:	Essential hypertension  Goal:	med management.  Instructions for follow-up, activity and diet:	follow up with your pcp.  Secondary Diagnosis:	Malignant neoplasm of colon, unspecified part of colon  Goal:	continue chemo per your oncologist.  Instructions for follow-up, activity and diet:	follow up with your oncologist.  Secondary Diagnosis:	Hypercholesterolemia  Goal:	continue with simvastatin.  Instructions for follow-up, activity and diet:	follow up with your pcp.  Secondary Diagnosis:	Other chronic pulmonary embolism  Goal:	hold off on all anticoagulation.  You are still anemic.  Gluteal hematoma will take time to reabsorb. You will need close outpatient follow up.  Instructions for follow-up, activity and diet:	follow up with your pcp.  Secondary Diagnosis:	Atrial flutter, unspecified type  Goal:	medical management as above.  Instructions for follow-up, activity and diet:	follow up with your cardiologist.

## 2017-02-07 NOTE — DISCHARGE NOTE ADULT - MEDICATION SUMMARY - MEDICATIONS TO STOP TAKING
I will STOP taking the medications listed below when I get home from the hospital:    Lovenox  --  injectable

## 2017-02-07 NOTE — PHYSICAL THERAPY INITIAL EVALUATION ADULT - DIAGNOSIS, PT EVAL
Arm pain, dizziness. Elevated troponins. Hematomas left gluteus marce and medius. Arm pain, dizziness. Elevated troponins. A-flutter, hematomas left gluteus marce and medius.

## 2017-02-07 NOTE — DISCHARGE NOTE ADULT - MEDICATION SUMMARY - MEDICATIONS TO TAKE
I will START or STAY ON the medications listed below when I get home from the hospital:    oxyCODONE 5 mg oral tablet  -- 1 tab(s) by mouth every 4 hours, As needed, Moderate Pain (4 - 6) -for severe pain MDD:30  -- Indication: For for pain.    amiodarone 200 mg oral tablet  -- 2 tab(s) by mouth every 12 hours for 6 doses.  Then take 200mg daily until follow up with your physician.  -- Indication: For SVT (supraventricular tachycardia)/atrial flutter.    simvastatin  --  by mouth   -- Indication: For Hypercholesterolemia    metoprolol succinate 25 mg oral tablet, extended release  -- 1 tab(s) by mouth once a day  -- Indication: For SVT (supraventricular tachycardia)    amphetamine-dextroamphetamine 5 mg oral tablet  -- 1 tab(s) by mouth once a day  -- Indication: For resume previous home medication    Lumigan  --  to each affected eye   -- Indication: For resume previous home medication.

## 2017-02-07 NOTE — PROGRESS NOTE ADULT - PROBLEM SELECTOR PLAN 4
- repeat cbc in am; pt was on steroids recently; no signs of infection
Resolving and improving.
- stg IV see hpi

## 2017-02-07 NOTE — DISCHARGE NOTE ADULT - SECONDARY DIAGNOSIS.
ELIANA (acute kidney injury) Essential hypertension Malignant neoplasm of colon, unspecified part of colon Hypercholesterolemia Other chronic pulmonary embolism Atrial flutter, unspecified type

## 2017-02-07 NOTE — PROGRESS NOTE ADULT - PROBLEM SELECTOR PLAN 5
- check a1c; sliding scale
Mildly elevated and will be adding diltazem for afib/flutter/svt so hopefulyy this will control BP as well.
- check a1c; pending a1c

## 2017-02-07 NOTE — DISCHARGE NOTE ADULT - CARE PROVIDER_API CALL
your electrophysiologist.,   Phone: (   )    -  Fax: (   )    -    your pcp,   Phone: (   )    -  Fax: (   )    -

## 2017-02-07 NOTE — PROGRESS NOTE ADULT - ASSESSMENT
72 y/o M PMHx colon ca, on chemo, Afib was on heart monitor for 1 month and was on metoprolol, PE  one month ago, presents to the ED c/o arms pain and dizziness when he tried to stand up
70 y/o M PMHx colon ca, on chemo, Afib was on heart monitor for 1 month and was on metoprolol, PE  one month ago, presents to the ED c/o arms pain and dizziness when he tried to stand up
HX of DVT PE  oct 2016   on lovenox  , which will be held  , until the acute anemia evaluation is done , discussed with dr Smallwood ,  wife at bed side ,

## 2017-02-07 NOTE — DISCHARGE NOTE ADULT - PROVIDER TOKENS
FREE:[LAST:[your electrophysiologist.],PHONE:[(   )    -],FAX:[(   )    -]],FREE:[LAST:[your pcp],PHONE:[(   )    -],FAX:[(   )    -]]

## 2017-02-07 NOTE — PHYSICAL THERAPY INITIAL EVALUATION ADULT - PERTINENT HX OF CURRENT PROBLEM, REHAB EVAL
Pt admitted to  secondary to arm pain and dizziness. H/H-8.8/26.0, troponins: 2/4- 0.135, 0.221. Left hip x-ray: neg. CT pelvis: Mod-large hematoma gluteus marce. Min-mod hematoma gluteus medius.

## 2017-02-07 NOTE — DISCHARGE NOTE ADULT - HOSPITAL COURSE
70 y/o M PMHx colon ca, on chemo, Afib was on heart monitor for 1 month and was on metoprolol, PE  one month ago, presents to the ED c/o arms pain and dizziness when he tried to stand up. When he sat down the symptoms improved.  Alarmed, the wife summoned him to come to ER; here he was found in PSVT and responded well to adenosisne. Has evidence of ELIANA and he is on LMWH for VTE. Will be adm to tele. His psvt is probab sec to dehydration that caused RF; r/o obstruction ( less likely, normal micturition no abd pain).  Pt has a h/o stg IV colon ca s/p 2 resections and he is on chemo, last one 3 weeks ago. He is also in clinical trial with a " virus infusion"- Reolysin.      Pt was started on amio loading per cardiology.  Currently HR sinus 60-80s.  He denies any palpitations, CP, N, V.  Pt has been complaining about buttock pain and it was discovered he has a gluteal hematoma.  H+H has been stable.  Pt eager to go home with outpatient follow up.      REVIEW OF SYSTEMS:    CONSTITUTIONAL: No weakness, fevers or chills, + gluteal pain  EYES/ENT: No visual changes;  No vertigo or throat pain   NECK: No pain or stiffness  RESPIRATORY: No cough, wheezing, hemoptysis; No shortness of breath  CARDIOVASCULAR: No chest pain or palpitations  GASTROINTESTINAL: No abdominal or epigastric pain. No nausea, vomiting, or hematemesis; No diarrhea or constipation. No melena or hematochezia.  GENITOURINARY: No dysuria, frequency or hematuria  NEUROLOGICAL: No numbness or weakness  SKIN: No itching, burning, rashes, or lesions   All other review of systems is negative unless indicated above.    Vital Signs Last 24 Hrs  T(C): 37.2, Max: 37.2 (02-08 @ 10:58)  T(F): 98.9, Max: 98.9 (02-08 @ 10:58)  HR: 70 (70 - 89)  BP: 133/78 (133/78 - 141/92)  BP(mean): --  RR: 16 (16 - 16)  SpO2: 98% (94% - 98%)    PHYSICAL EXAM:    Constitutional: NAD, awake and alert, well-developed  HEENT: PERR, EOMI, Normal Hearing, MMM  Neck: Soft and supple, No LAD, No JVD  Respiratory: Breath sounds are clear bilaterally, No wheezing, rales or rhonchi  Cardiovascular: S1 and S2, regular rate and rhythm, no Murmurs, gallops or rubs  Gastrointestinal: Bowel Sounds present, soft, nontender, nondistended, no guarding, no rebound  Extremities: No peripheral edema  Vascular: 2+ peripheral pulses  Neurological: A/O x 3, no focal deficits  Musculoskeletal: 5/5 strength b/l upper and lower extremities  Skin: No rashes

## 2017-02-07 NOTE — PROGRESS NOTE ADULT - PROBLEM SELECTOR PROBLEM 4
Leukocytosis, unspecified type
ELIANA (acute kidney injury)
Malignant neoplasm of colon, unspecified part of colon

## 2017-02-07 NOTE — DISCHARGE NOTE ADULT - DURABLE MEDICAL EQUIPMENT AGENCY
[facility] [phone], RW [facility] [phone], RW provided to pt at bedside.

## 2017-02-07 NOTE — DISCHARGE NOTE ADULT - PLAN OF CARE
medical management. Follow up with your EP doctor on Friday.  Continue amiodarone as prescribed until then.  Continue metoprolol 25mg daily as well. improving. Encourage oral hydration.  follow up with your pcp. med management. follow up with your pcp. continue chemo per your oncologist. follow up with your oncologist. continue with simvastatin. hold off on all anticoagulation.  You are still anemic.  Gluteal hematoma will take time to reabsorb. You will need close outpatient follow up. medical management as above. follow up with your cardiologist.

## 2017-02-08 VITALS
OXYGEN SATURATION: 98 % | TEMPERATURE: 99 F | HEART RATE: 70 BPM | DIASTOLIC BLOOD PRESSURE: 78 MMHG | SYSTOLIC BLOOD PRESSURE: 133 MMHG | RESPIRATION RATE: 16 BRPM

## 2017-02-08 LAB
HCT VFR BLD CALC: 24.9 % — LOW (ref 39–50)
HGB BLD-MCNC: 8.1 G/DL — LOW (ref 13–17)
MCHC RBC-ENTMCNC: 30.6 PG — SIGNIFICANT CHANGE UP (ref 27–34)
MCHC RBC-ENTMCNC: 32.7 GM/DL — SIGNIFICANT CHANGE UP (ref 32–36)
MCV RBC AUTO: 93.7 FL — SIGNIFICANT CHANGE UP (ref 80–100)
PLATELET # BLD AUTO: 183 K/UL — SIGNIFICANT CHANGE UP (ref 150–400)
RBC # BLD: 2.65 M/UL — LOW (ref 4.2–5.8)
RBC # FLD: 15.2 % — HIGH (ref 10.3–14.5)
WBC # BLD: 13.9 K/UL — HIGH (ref 3.8–10.5)
WBC # FLD AUTO: 13.9 K/UL — HIGH (ref 3.8–10.5)

## 2017-02-08 PROCEDURE — 99233 SBSQ HOSP IP/OBS HIGH 50: CPT

## 2017-02-08 RX ORDER — DEXTROAMPHETAMINE SACCHARATE, AMPHETAMINE ASPARTATE, DEXTROAMPHETAMINE SULFATE AND AMPHETAMINE SULFATE 1.875; 1.875; 1.875; 1.875 MG/1; MG/1; MG/1; MG/1
1 TABLET ORAL
Qty: 0 | Refills: 0 | COMMUNITY
Start: 2017-02-08

## 2017-02-08 RX ORDER — DEXTROAMPHETAMINE SACCHARATE, AMPHETAMINE ASPARTATE, DEXTROAMPHETAMINE SULFATE AND AMPHETAMINE SULFATE 1.875; 1.875; 1.875; 1.875 MG/1; MG/1; MG/1; MG/1
0 TABLET ORAL
Qty: 0 | Refills: 0 | COMMUNITY

## 2017-02-08 RX ORDER — METOPROLOL TARTRATE 50 MG
0 TABLET ORAL
Qty: 0 | Refills: 0 | COMMUNITY

## 2017-02-08 RX ORDER — AMIODARONE HYDROCHLORIDE 400 MG/1
2 TABLET ORAL
Qty: 30 | Refills: 0 | OUTPATIENT
Start: 2017-02-08

## 2017-02-08 RX ORDER — METOPROLOL TARTRATE 50 MG
1 TABLET ORAL
Qty: 0 | Refills: 0 | COMMUNITY
Start: 2017-02-08

## 2017-02-08 RX ORDER — ENOXAPARIN SODIUM 100 MG/ML
0 INJECTION SUBCUTANEOUS
Qty: 0 | Refills: 0 | COMMUNITY

## 2017-02-08 RX ORDER — OXYCODONE HYDROCHLORIDE 5 MG/1
1 TABLET ORAL
Qty: 15 | Refills: 0 | OUTPATIENT
Start: 2017-02-08

## 2017-02-08 RX ORDER — METOPROLOL TARTRATE 50 MG
25 TABLET ORAL DAILY
Qty: 0 | Refills: 0 | Status: DISCONTINUED | OUTPATIENT
Start: 2017-02-08 | End: 2017-02-08

## 2017-02-08 RX ADMIN — DEXTROAMPHETAMINE SACCHARATE, AMPHETAMINE ASPARTATE, DEXTROAMPHETAMINE SULFATE AND AMPHETAMINE SULFATE 5 MILLIGRAM(S): 1.875; 1.875; 1.875; 1.875 TABLET ORAL at 13:23

## 2017-02-08 RX ADMIN — Medication 100 MILLIGRAM(S): at 13:22

## 2017-02-08 RX ADMIN — Medication 30 MILLILITER(S): at 07:48

## 2017-02-08 RX ADMIN — Medication 100 MILLIGRAM(S): at 05:23

## 2017-02-08 RX ADMIN — AMIODARONE HYDROCHLORIDE 400 MILLIGRAM(S): 400 TABLET ORAL at 05:23

## 2017-02-08 RX ADMIN — MORPHINE SULFATE 4 MILLIGRAM(S): 50 CAPSULE, EXTENDED RELEASE ORAL at 11:38

## 2017-02-08 RX ADMIN — MORPHINE SULFATE 4 MILLIGRAM(S): 50 CAPSULE, EXTENDED RELEASE ORAL at 10:17

## 2017-02-08 NOTE — PROGRESS NOTE ADULT - PROBLEM SELECTOR PLAN 2
- ultrasound shows no hydronephrosis  - has improved w hydration  - was on tamiflu and prednisone recently  - ivf
No recurrence.  Treatment the same as for afib/flut.
Related to demand ischemia, anemia and renal insufficiency which is improving. No active CAD symptoms currently so will recommend further ischemia evaluation once recovered from these events as an outpatient.
pt in atrial flutter  - persistent; amio was rec by cardio and Cardizem was added
Probably due to demand ischemia , will continue to monitor

## 2017-02-08 NOTE — PROGRESS NOTE ADULT - PROBLEM SELECTOR PROBLEM 2
Troponin level elevated
ELIANA (acute kidney injury)
Elevated troponin I level
Atrial flutter, unspecified type
SVT (supraventricular tachycardia)

## 2017-02-08 NOTE — PROGRESS NOTE ADULT - SUBJECTIVE AND OBJECTIVE BOX
CHIEF COMPLAINT: Fatigue and generalized weakness,afib , anemia     HPI:  72 y/o M PMHx metastatic colon cancer (lung, liver) receiving chemotherapy ([facility] trial), perioperative atrial fibrillation several years ago with no known recurrence, recent PE & DVT (now anticoagulated with Lovenox) who presented to the ER yesterday with generalized weakness, fatigue, decreased functional status, dizziness, and bilateral shoulder pain. He was tachycardic upon arrival and treated with adenosine for suspected SVT.  He was also noted to have elevated BUN/Cr and was diagnosed with dehydration and administered IVF.  He has had multiple recurrences of AF/AFL since admission.  He denies: palpitations, angina, orthopnea, dyspnea.  Recent fall ~6 days ago; felt lightheaded and near-syncopal prior to admission.  Unable to identify exacerbating / alleviating factors.    2/6/2017  Patient is complaining of having pain left buttock area , no ecchymosis seen , his hemoglobin drop significantly , patient denies any chest pain or shortness of breath  ,      PAST MEDICAL & SURGICAL HISTORY:  Other chronic pulmonary embolism  Colon cancer  HTN (hypertension)  Hypercholesterolemia  Glaucoma of both eyes, unspecified glaucoma  History of colon resection      MEDICATIONS  (STANDING):  sodium chloride 0.9%. 1000milliLiter(s) IV Continuous <Continuous>  docusate sodium 100milliGRAM(s) Oral three times a day  enoxaparin Injectable 80milliGRAM(s) SubCutaneous every 12 hours  simvastatin 20milliGRAM(s) Oral at bedtime  amphetamine/dextroamphetamine 5milliGRAM(s) Oral daily  latanoprost 0.005% Ophthalmic Solution 1Drop(s) Both EYES at bedtime  amiodarone    Tablet 400milliGRAM(s) Oral once  amiodarone    Tablet 400milliGRAM(s) Oral every 12 hours    MEDICATIONS  (PRN):  acetaminophen   Tablet 650milliGRAM(s) Oral every 6 hours PRN pain,fever  ondansetron Injectable 4milliGRAM(s) IV Push every 6 hours PRN Nausea  aluminum hydroxide/magnesium hydroxide/simethicone Suspension 30milliLiter(s) Oral every 4 hours PRN Dyspepsia  senna 2Tablet(s) Oral at bedtime PRN Constipation  dextrose Gel 1Dose(s) Oral once PRN Blood Glucose LESS THAN 70 milliGRAM(s)/deciliter  oxyCODONE IR 5milliGRAM(s) Oral every 4 hours PRN Moderate Pain (4 - 6)      ALLERGIES: penicillins (Rash)    REVIEW OF SYSTEMS:    CONSTITUTIONAL: Weakness, no fevers or chills  EYES/ENT: No visual changes;  No vertigo or throat pain   NECK: No pain or stiffness  RESPIRATORY: No cough, wheezing, hemoptysis; No shortness of breath  CARDIOVASCULAR: No chest pain or palpitations  GASTROINTESTINAL: No abdominal pain. No nausea, vomiting, or hematemesis; No diarrhea or constipation. No melena or hematochezia.  GENITOURINARY: No dysuria, frequency or hematuria  NEUROLOGICAL: Dizziness, lightheadedness. No numbness or weakness  SKIN: No itching or rash  All other review of systems is negative unless indicated above    VITAL SIGNS:   Vital Signs Last 24 Hrs  T(C): 37.1, Max: 37.1 (02-05 @ 11:31)  T(F): 98.8, Max: 98.8 (02-05 @ 11:31)  HR: 73 (73 - 85)  BP: 135/85 (127/83 - 150/80)  BP(mean): --  RR: 17 (17 - 18)  SpO2: 98% (98% - 100%)    PHYSICAL EXAM:    Constitutional: NAD, awake and alert, well-developed. Seated in bedside chair.  Eyes:  EOMI,  Pupils round,   Pulmonary: Non-labored, breath sounds are clear bilaterally, No wheezing, rales or rhonchi  Cardiovascular: Irregular, tachycardic. Normal S2.  Gastrointestinal: Abdomen is obese. Bowel Sounds present, soft, nontender.   Lymph: No peripheral edema. No cervical lymphadenopathy.  Neurological: Alert, no focal deficits  Skin: No rashes.  Psych:  Mood & affect appropriate                          7.9    13.0  )-----------( 148      ( 06 Feb 2017 06:46 )             23.6     06 Feb 2017 06:46    140    |  105    |  20     ----------------------------<  100    4.6     |  27     |  1.73     Ca    8.3        06 Feb 2017 06:46    TPro  8.3    /  Alb  3.3    /  TBili  0.9    /  DBili  x      /  AST  29     /  ALT  46     /  AlkPhos  113    04 Feb 2017 11:42    CARDIAC MARKERS ( 04 Feb 2017 17:09 )  0.221 ng/mL / x     / x     / x     / x      CARDIAC MARKERS ( 04 Feb 2017 11:42 )  0.135 ng/mL / x     / 104 U/L / x     / x          LIVER FUNCTIONS - ( 04 Feb 2017 11:42 )  Alb: 3.3 g/dL / Pro: 8.3 gm/dL / ALK PHOS: 113 U/L / ALT: 46 U/L / AST: 29 U/L / GGT: x           PT/INR - ( 04 Feb 2017 11:42 )   PT: 11.7 sec;   INR: 1.06 ratio         PTT - ( 04 Feb 2017 11:42 )  PTT:32.4 sec    Pro Bnp 572    EKG (multiple ECGs reviewed - most recent): Normal sinus rhythm, Left axis deviation early R wave transition. Nonspecific ST abnormality
PCP: Cardiology (Jack Watkins).    HPI:  72 y/o M PMHx colon ca, on chemo, Afib was on heart monitor for 1 month and was on metoprolol, PE  one month ago, presents to the ED c/o arms pain and dizziness when he tried to stand up. When he sat down the symptoms improved.  Alarmed, the wife summoned him to come to ER; here he was found in PSVT and responded well to adenosisne. Has evidence of ELIANA and he is on LMWH for VTE. Will be adm to tele. His psvt is probab sec to dehydration that caused RF; r/o obstruction ( less likely, normal micturition no abd pain).  Pt has a h/o stg IV colon ca s/p 2 resections and he is on chemo, last one 3 weeks ago. He is also in clinical trial with a " virus infusion"- Reolysin (04 Feb 2017 16:14)    2/6/2017  Patient is complaining of having pain left buttock area , no ecchymosis seen , his hemoglobin drop significantly , patient denies any chest pain or shortness of breath  ,    2/7/17: Buttock area still sore and denies chest pain, palpitations or SOB.    PAST MEDICAL & SURGICAL HISTORY:  Other chronic pulmonary embolism  Colon cancer  HTN (hypertension)  Hypercholesterolemia  Glaucoma of both eyes, unspecified glaucoma  History of colon resection    HEALTH ISSUES - PROBLEM Dx:  Troponin level elevated: Troponin level elevated  Atrial flutter, unspecified type: Atrial flutter, unspecified type  Hyperglycemia, unspecified: Hyperglycemia, unspecified  Leukocytosis, unspecified type: Leukocytosis, unspecified type  Malignant neoplasm of colon, unspecified part of colon: Malignant neoplasm of colon, unspecified part of colon  ELIANA (acute kidney injury): ELIANA (acute kidney injury)  Other chronic pulmonary embolism: Other chronic pulmonary embolism  SVT (supraventricular tachycardia): SVT (supraventricular tachycardia)          SOCIAL HISTORY: Non-Smoker/Social ETOH/ No Ilicit Drug use.    FAMILY HISTORY:  No pertinent family history in first degree relatives      Allergies    penicillin (Unknown)  penicillins (Rash)    HOME MEDICATIONS: See [facility]    [facility] MEDICATIONS:   MEDICATIONS  (STANDING):  sodium chloride 0.9%. 1000milliLiter(s) IV Continuous <Continuous>  docusate sodium 100milliGRAM(s) Oral three times a day  simvastatin 20milliGRAM(s) Oral at bedtime  amphetamine/dextroamphetamine 5milliGRAM(s) Oral daily  latanoprost 0.005% Ophthalmic Solution 1Drop(s) Both EYES at bedtime  amiodarone Tablet 400milliGRAM(s) Oral every 12 hours    MEDICATIONS  (PRN):  acetaminophen   Tablet 650milliGRAM(s) Oral every 6 hours PRN pain,fever  ondansetron Injectable 4milliGRAM(s) IV Push every 6 hours PRN Nausea  aluminum hydroxide/magnesium hydroxide/simethicone Suspension 30milliLiter(s) Oral every 4 hours PRN Dyspepsia  senna 2Tablet(s) Oral at bedtime PRN Constipation  dextrose Gel 1Dose(s) Oral once PRN Blood Glucose LESS THAN 70 milliGRAM(s)/deciliter  oxyCODONE IR 5milliGRAM(s) Oral every 4 hours PRN Moderate Pain (4 - 6)  morphine  - Injectable 4milliGRAM(s) IV Push every 4 hours PRN Moderate Pain (4 - 6)      REVIEW OF SYSTEMS: 13 systems were reviewed and all negative except for comments above.    Vital Signs Last 24 Hrs  T(C): 36.7, Max: 36.7 (02-06 @ 19:35)  T(F): 98, Max: 98 (02-06 @ 19:35)  HR: 77 (77 - 87)  BP: 143/80 (143/73 - 161/87)  BP(mean): --  RR: 17 (16 - 19)  SpO2: 98% (98% - 100%)Daily     Daily I&O's Summary    I & Os for current day (as of 07 Feb 2017 09:32)  =============================================  IN: 0 ml / OUT: 875 ml / NET: -875 ml      PHYSICAL EXAM:    Constitutional: NAD, awake and alert,   HEENT: PERRLA, EOMI,  No oral cyananosis. Oropharnx Clean and Dry.  Neck:  supple,  No JVD, No Thyroid enlargement. No Carotid Bruits bilaterally.  Respiratory: Breath sounds are clear bilaterally, No wheezing, rales or rhonchi  Cardiovascular: S1 and S2, IR IR  Gastrointestinal: Bowel Sounds present, soft, NT, ND.  Extremities: No peripheral edema. No clubbing or cyanosis.  Vascular: 2+ peripheral pulses in LE   Neurological: A/O x 3, no focal motor or sensory deficits  Musculoskeletal: Swelling brook ffected leg improved and only with minimal edema.  Skin: No rashes or lessions.      LABS: All Labs Reviewed:                        8.5    12.8  )-----------( 134      ( 07 Feb 2017 04:32 )             25.8                         7.9    13.0  )-----------( 148      ( 06 Feb 2017 06:46 )             23.6                         9.1    17.1  )-----------( 187      ( 05 Feb 2017 05:46 )             27.7     06 Feb 2017 06:46    140    |  105    |  20     ----------------------------<  100    4.6     |  27     |  1.73   05 Feb 2017 05:46    141    |  105    |  24     ----------------------------<  108    4.8     |  29     |  1.85   04 Feb 2017 11:42    141    |  102    |  25     ----------------------------<  198    4.2     |  27     |  2.32     Ca    8.3        06 Feb 2017 06:46Ca    8.4        05 Feb 2017 05:46  Ca    8.6        04 Feb 2017 11:42    TPro  8.3    /  Alb  3.3    /  TBili  0.9    /  DBili  x      /  AST  29     /  ALT  46     /  AlkPhos  113    04 Feb 2017 11:42    Pro Bnp 572    EKG: Normal sinus rhythm, Left axis deviation, Nonspecific ST abnormality, LAD  2/5/17      ECHO: 10/2016 :   Summary  The left ventricle is normal in size, wall thickness, wall motion and contractility.  Estimated left ventricular ejection fraction is 55-60 %.  The left atrium is mildly dilated.  Normal right atrium.  Normal right ventricle structure and function.  Fibrocalcific changes noted to the aortic valve leaflets with preserved excursion.  Mild to Moderate (2+) mitral regurgitation is present.  Moderate (2+) tricuspid valve regurgitation is present.  Trace pulmonic valvular regurgitation is present.    TELE: Afib/flut with mod vr.
Subjective:  c/o Right hip pain  had fall last week but was ambulating up until yesterday  remains in atrial flutter      MEDICATIONS  (STANDING):  sodium chloride 0.9%. 1000milliLiter(s) IV Continuous <Continuous>  docusate sodium 100milliGRAM(s) Oral three times a day  enoxaparin Injectable 80milliGRAM(s) SubCutaneous every 12 hours  simvastatin 20milliGRAM(s) Oral at bedtime  amphetamine/dextroamphetamine 5milliGRAM(s) Oral daily  latanoprost 0.005% Ophthalmic Solution 1Drop(s) Both EYES at bedtime  insulin lispro (HumaLOG) corrective regimen sliding scale  SubCutaneous three times a day before meals  dextrose 5%. 1000milliLiter(s) IV Continuous <Continuous>  dextrose 50% Injectable 12.5Gram(s) IV Push once  dextrose 50% Injectable 25Gram(s) IV Push once  dextrose 50% Injectable 25Gram(s) IV Push once  metoprolol succinate ER 25milliGRAM(s) Oral daily    MEDICATIONS  (PRN):  acetaminophen   Tablet 650milliGRAM(s) Oral every 6 hours PRN pain,fever  ondansetron Injectable 4milliGRAM(s) IV Push every 6 hours PRN Nausea  aluminum hydroxide/magnesium hydroxide/simethicone Suspension 30milliLiter(s) Oral every 4 hours PRN Dyspepsia  senna 2Tablet(s) Oral at bedtime PRN Constipation  dextrose Gel 1Dose(s) Oral once PRN Blood Glucose LESS THAN 70 milliGRAM(s)/deciliter  glucagon  Injectable 1milliGRAM(s) IntraMuscular once PRN Glucose LESS THAN 70 milligrams/deciliter      Allergies    penicillin (Unknown)  penicillins (Rash)    Intolerances        REVIEW OF SYSTEMS:    CONSTITUTIONAL:  As per HPI.  HEENT:  Eyes:  No diplopia or blurred vision. ENT:  No earache, sore throat or runny nose.  CARDIOVASCULAR:  No pressure, squeezing, tightness, heaviness or aching about the chest, neck, axilla or epigastrium.  RESPIRATORY:  No cough, shortness of breath, PND or orthopnea.  GASTROINTESTINAL:  No nausea, vomiting or diarrhea.  GENITOURINARY:  No dysuria, frequency or urgency.  MUSCULOSKELETAL:  no joint pain, deformity, tenderness  EXTREMITIES: no clubbing cyanosis,edema  SKIN:  No change in skin, hair or nails.  NEUROLOGIC:  No paresthesias, fasciculations, seizures or weakness.  PSYCHIATRIC:  No disorder of thought or mood.  ENDOCRINE:  No heat or cold intolerance, polyuria or polydipsia.  HEMATOLOGICAL:  No easy bruising or bleedings:    Vital Signs Last 24 Hrs  T(C): 36.3, Max: 36.3 (02-04 @ 11:27)  T(F): 97.4, Max: 97.4 (02-04 @ 11:27)  HR: 85 (70 - 162)  BP: 150/80 (108/82 - 150/80)  BP(mean): --  RR: 18 (16 - 20)  SpO2: 98% (98% - 100%)    PHYSICAL EXAMINATION:  SKIN: no rashes  HEAD: NC/AT  EYES: PERRLA, EOMI  EARS: TM's intact  NOSE: no abnormalities  NECK:  Supple. No lymphadenopathy. Jugular venous pressure not elevated. Carotids equal.   HEART:   The cardiac impulse has a normal quality. Reg., Nl S1 and S2.  There are no murmurs, rubs or gallops noted  CHEST:  Chest is clear to auscultation. Normal respiratory effort.  ABDOMEN:  Soft and nontender.   EXTREMITIES:  no C/C/E  NEURO: AAO x 3, no focal deficts       LABS:                        9.1    17.1  )-----------( 187      ( 05 Feb 2017 05:46 )             27.7     05 Feb 2017 05:46    141    |  105    |  24     ----------------------------<  108    4.8     |  29     |  1.85     Ca    8.4        05 Feb 2017 05:46    TPro  8.3    /  Alb  3.3    /  TBili  0.9    /  DBili  x      /  AST  29     /  ALT  46     /  AlkPhos  113    04 Feb 2017 11:42    PT/INR - ( 04 Feb 2017 11:42 )   PT: 11.7 sec;   INR: 1.06 ratio         PTT - ( 04 Feb 2017 11:42 )  PTT:32.4 sec      RADIOLOGY & ADDITIONAL TESTS:
PCP: Cardiology (Jack Watkins).    HPI:  70 y/o M PMHx colon ca, on chemo, Afib was on heart monitor for 1 month and was on metoprolol, PE  one month ago, presents to the ED c/o arms pain and dizziness when he tried to stand up. When he sat down the symptoms improved.  Alarmed, the wife summoned him to come to ER; here he was found in PSVT and responded well to adenosisne. Has evidence of ELIANA and he is on LMWH for VTE. Will be adm to tele. His psvt is probab sec to dehydration that caused RF; r/o obstruction ( less likely, normal micturition no abd pain).  Pt has a h/o stg IV colon ca s/p 2 resections and he is on chemo, last one 3 weeks ago. He is also in clinical trial with a " virus infusion"- Reolysin (04 Feb 2017 16:14)    2/6/2017  Patient is complaining of having pain left buttock area , no ecchymosis seen , his hemoglobin drop significantly , patient denies any chest pain or shortness of breath  ,    2/7/17: Buttock area still sore and denies chest pain, palpitations or SOB.    2/8/17:  Comfortable; ambulating in room; no cardiac symptoms; less buttock pain.    PAST MEDICAL & SURGICAL HISTORY:  Other chronic pulmonary embolism  Colon cancer  HTN (hypertension)  Hypercholesterolemia  Glaucoma of both eyes, unspecified glaucoma  History of colon resection    MEDICATIONS  (STANDING):  docusate sodium 100milliGRAM(s) Oral three times a day  simvastatin 20milliGRAM(s) Oral at bedtime  amphetamine/dextroamphetamine 5milliGRAM(s) Oral daily  latanoprost 0.005% Ophthalmic Solution 1Drop(s) Both EYES at bedtime  amiodarone    Tablet 400milliGRAM(s) Oral every 12 hours  diltiazem    Tablet 60milliGRAM(s) Oral every 6 hours    MEDICATIONS  (PRN):  acetaminophen   Tablet 650milliGRAM(s) Oral every 6 hours PRN pain,fever  ondansetron Injectable 4milliGRAM(s) IV Push every 6 hours PRN Nausea  aluminum hydroxide/magnesium hydroxide/simethicone Suspension 30milliLiter(s) Oral every 4 hours PRN Dyspepsia  senna 2Tablet(s) Oral at bedtime PRN Constipation  dextrose Gel 1Dose(s) Oral once PRN Blood Glucose LESS THAN 70 milliGRAM(s)/deciliter  oxyCODONE IR 5milliGRAM(s) Oral every 4 hours PRN Moderate Pain (4 - 6)  morphine  - Injectable 4milliGRAM(s) IV Push every 4 hours PRN Moderate Pain (4 - 6)    REVIEW OF SYSTEMS: 13 systems were reviewed and all negative except for comments above.    Vital Signs Last 24 Hrs  T(C): 36.9, Max: 36.9 (02-08 @ 05:21)  T(F): 98.5, Max: 98.5 (02-08 @ 05:21)  HR: 89 (70 - 89)  BP: 141/92 (134/75 - 152/83)  BP(mean): --  RR: 16 (16 - 16)  SpO2: 95% (94% - 97%)    PHYSICAL EXAM:  Constitutional: NAD, awake and alert,   HEENT: Round pupils, EOMI,    Neck:  supple,  No JVD,   Respiratory: Breath sounds are clear bilaterally, No wheezing, rales or rhonchi  Cardiovascular: S1 and S2, regular  Gastrointestinal: Bowel Sounds present, soft, NT, ND.  Extremities: No peripheral edema. No clubbing or cyanosis.  Neurological: A/O x 3  Skin: No rashes or lesions.    LABS: All Labs Reviewed:                                   8.1    13.9  )-----------( 183      ( 08 Feb 2017 06:33 )             24.9     EKG: Normal sinus rhythm, Left axis deviation, Nonspecific ST abnormality, LAD  2/5/17    ECHO: 10/2016 :   The left ventricle is normal in size, wall thickness, wall motion and contractility.  Estimated left ventricular ejection fraction is 55-60 %.  The left atrium is mildly dilated.  Normal right atrium.  Normal right ventricle structure and function.  Fibrocalcific changes noted to the aortic valve leaflets with preserved excursion.  Mild to Moderate (2+) mitral regurgitation is present.  Moderate (2+) tricuspid valve regurgitation is present.  Trace pulmonic valvular regurgitation is present.    TELE: Sinus rhythm
Subjective:  less  Right hip pain  remains in atrial flutter      REVIEW OF SYSTEMS:    CONSTITUTIONAL:  As per HPI.  HEENT:  Eyes:  No diplopia or blurred vision. ENT:  No earache, sore throat or runny nose.  CARDIOVASCULAR:  No pressure, squeezing, tightness, heaviness or aching about the chest, neck, axilla or epigastrium.  RESPIRATORY:  No cough, shortness of breath, PND or orthopnea.  GASTROINTESTINAL:  No nausea, vomiting or diarrhea.  GENITOURINARY:  No dysuria, frequency or urgency.  MUSCULOSKELETAL: less left hip pain; did not ambulate  EXTREMITIES: no clubbing cyanosis, edema  SKIN:  No change in skin, hair or nails.  NEUROLOGIC:  No paresthesias, fasciculations, seizures or weakness.  PSYCHIATRIC:  No disorder of thought or mood.  ENDOCRINE:  No heat or cold intolerance, polyuria or polydipsia.  HEMATOLOGICAL:  No easy bruising or bleedings:    Vital Signs Last 24 Hrs  T(C): 36.7, Max: 36.7 (02-06 @ 19:35)  T(F): 98, Max: 98 (02-06 @ 19:35)  HR: 77 (77 - 87)  BP: 143/80 (143/73 - 161/87)  BP(mean): --  RR: 17 (16 - 19)  SpO2: 98% (98% - 100%)    PHYSICAL EXAMINATION:  SKIN: no rashes  HEAD: NC/AT  EYES: PERRLA, EOMI  EARS: TM's intact  NOSE: no abnormalities  NECK:  Supple. No lymphadenopathy. Jugular venous pressure not elevated. Carotids equal.   HEART:   The cardiac impulse has a normal quality. Reg., Nl S1 and S2.  There are no murmurs, rubs or gallops noted  CHEST:  Chest is clear to auscultation. Normal respiratory effort.  ABDOMEN:  Soft and nontender.   EXTREMITIES:  no C/C/E  NEURO: AAO x 3, no focal deficts       LABS:                             8.5    12.8  )-----------( 134      ( 07 Feb 2017 04:32 )             25.8   06 Feb 2017 06:46    140    |  105    |  20     ----------------------------<  100    4.6     |  27     |  1.73     Ca    8.3        06 Feb 2017 06:46        RADIOLOGY & ADDITIONAL TESTS:

## 2017-02-08 NOTE — PROGRESS NOTE ADULT - PROBLEM SELECTOR PROBLEM 3
ELIANA (acute kidney injury)
Essential hypertension
Malignant neoplasm of colon, unspecified part of colon
Elevated troponin I level
Other chronic pulmonary embolism

## 2017-02-08 NOTE — PROGRESS NOTE ADULT - PROBLEM SELECTOR PLAN 3
- stg IV see hpi
Mildly elevated; continue current antihypertensive regimen
Related to demand ischemia, anemia and renal insufficiency which is improving. No active CAD symptoms currently so will recommend further ischemia evaluation once recovered from these events as an outpatient.
- off AC b/o hemorrhage; s/p IVC filter yesterday
improving  continue fluid intake ,

## 2017-02-08 NOTE — PROGRESS NOTE ADULT - PROBLEM SELECTOR PLAN 1
- pt on LMWH if creatinine not improving he might need to be on VKA
Atrial flutter is paroxysmal - currently in sinus; continue oral amiodarone load (400mg BID x 6 more doses) and then change to 200mg daily.  Resume metoprolol (d/c short-acting cardizem); resume Lovenox.  Patient plans to see his outpatient electrophysiologist tommorrow or Friday.
Has recurred despite increase in amiodarone.  Will diltazem and continue to hold ac due to bleeding issues.
- off LMWH, s/p PRBC, stable; repeat cbc today and in am poss dc home if AF/AFL controlled
continue amiodarone , changed to po 200 mg po daily , monitor the rate , patient hemoglobin dropped significantly , will hold lovenox until work is done ,

## 2017-02-13 DIAGNOSIS — I47.1 SUPRAVENTRICULAR TACHYCARDIA: ICD-10-CM

## 2017-02-13 DIAGNOSIS — D72.829 ELEVATED WHITE BLOOD CELL COUNT, UNSPECIFIED: ICD-10-CM

## 2017-02-13 DIAGNOSIS — R73.9 HYPERGLYCEMIA, UNSPECIFIED: ICD-10-CM

## 2017-02-13 DIAGNOSIS — Z86.718 PERSONAL HISTORY OF OTHER VENOUS THROMBOSIS AND EMBOLISM: ICD-10-CM

## 2017-02-13 DIAGNOSIS — C78.7 SECONDARY MALIGNANT NEOPLASM OF LIVER AND INTRAHEPATIC BILE DUCT: ICD-10-CM

## 2017-02-13 DIAGNOSIS — H40.9 UNSPECIFIED GLAUCOMA: ICD-10-CM

## 2017-02-13 DIAGNOSIS — D64.9 ANEMIA, UNSPECIFIED: ICD-10-CM

## 2017-02-13 DIAGNOSIS — C18.9 MALIGNANT NEOPLASM OF COLON, UNSPECIFIED: ICD-10-CM

## 2017-02-13 DIAGNOSIS — Z90.49 ACQUIRED ABSENCE OF OTHER SPECIFIED PARTS OF DIGESTIVE TRACT: ICD-10-CM

## 2017-02-13 DIAGNOSIS — E78.00 PURE HYPERCHOLESTEROLEMIA, UNSPECIFIED: ICD-10-CM

## 2017-02-13 DIAGNOSIS — E86.0 DEHYDRATION: ICD-10-CM

## 2017-02-13 DIAGNOSIS — I48.92 UNSPECIFIED ATRIAL FLUTTER: ICD-10-CM

## 2017-02-13 DIAGNOSIS — I48.0 PAROXYSMAL ATRIAL FIBRILLATION: ICD-10-CM

## 2017-02-13 DIAGNOSIS — C78.00 SECONDARY MALIGNANT NEOPLASM OF UNSPECIFIED LUNG: ICD-10-CM

## 2017-02-13 DIAGNOSIS — Z86.711 PERSONAL HISTORY OF PULMONARY EMBOLISM: ICD-10-CM

## 2017-02-13 DIAGNOSIS — M79.81 NONTRAUMATIC HEMATOMA OF SOFT TISSUE: ICD-10-CM

## 2017-02-13 DIAGNOSIS — I24.8 OTHER FORMS OF ACUTE ISCHEMIC HEART DISEASE: ICD-10-CM

## 2017-02-13 DIAGNOSIS — I10 ESSENTIAL (PRIMARY) HYPERTENSION: ICD-10-CM

## 2017-02-13 DIAGNOSIS — R06.02 SHORTNESS OF BREATH: ICD-10-CM

## 2017-02-13 DIAGNOSIS — N17.9 ACUTE KIDNEY FAILURE, UNSPECIFIED: ICD-10-CM

## 2017-02-13 DIAGNOSIS — K43.9 VENTRAL HERNIA WITHOUT OBSTRUCTION OR GANGRENE: ICD-10-CM

## 2017-02-13 DIAGNOSIS — I27.82 CHRONIC PULMONARY EMBOLISM: ICD-10-CM

## 2017-02-13 DIAGNOSIS — Z92.21 PERSONAL HISTORY OF ANTINEOPLASTIC CHEMOTHERAPY: ICD-10-CM

## 2017-02-14 LAB — FACT X AG PPP IA-ACNC: 60 % — LOW

## 2017-06-30 ENCOUNTER — INPATIENT (INPATIENT)
Facility: HOSPITAL | Age: 72
LOS: 2 days | Discharge: HOME | End: 2017-07-03
Attending: INTERNAL MEDICINE | Admitting: INTERNAL MEDICINE
Payer: MEDICARE

## 2017-06-30 VITALS — WEIGHT: 188.05 LBS

## 2017-06-30 DIAGNOSIS — E78.00 PURE HYPERCHOLESTEROLEMIA, UNSPECIFIED: ICD-10-CM

## 2017-06-30 DIAGNOSIS — D64.9 ANEMIA, UNSPECIFIED: ICD-10-CM

## 2017-06-30 DIAGNOSIS — H40.9 UNSPECIFIED GLAUCOMA: ICD-10-CM

## 2017-06-30 DIAGNOSIS — R06.00 DYSPNEA, UNSPECIFIED: ICD-10-CM

## 2017-06-30 DIAGNOSIS — N17.9 ACUTE KIDNEY FAILURE, UNSPECIFIED: ICD-10-CM

## 2017-06-30 DIAGNOSIS — I48.91 UNSPECIFIED ATRIAL FIBRILLATION: ICD-10-CM

## 2017-06-30 DIAGNOSIS — R74.8 ABNORMAL LEVELS OF OTHER SERUM ENZYMES: ICD-10-CM

## 2017-06-30 DIAGNOSIS — I27.82 CHRONIC PULMONARY EMBOLISM: ICD-10-CM

## 2017-06-30 DIAGNOSIS — Z90.49 ACQUIRED ABSENCE OF OTHER SPECIFIED PARTS OF DIGESTIVE TRACT: Chronic | ICD-10-CM

## 2017-06-30 DIAGNOSIS — I10 ESSENTIAL (PRIMARY) HYPERTENSION: ICD-10-CM

## 2017-06-30 DIAGNOSIS — Z29.9 ENCOUNTER FOR PROPHYLACTIC MEASURES, UNSPECIFIED: ICD-10-CM

## 2017-06-30 DIAGNOSIS — Z95.828 PRESENCE OF OTHER VASCULAR IMPLANTS AND GRAFTS: Chronic | ICD-10-CM

## 2017-06-30 PROBLEM — C18.9 MALIGNANT NEOPLASM OF COLON, UNSPECIFIED: Chronic | Status: ACTIVE | Noted: 2017-02-04

## 2017-06-30 LAB
ALBUMIN SERPL ELPH-MCNC: 3.5 G/DL — SIGNIFICANT CHANGE UP (ref 3.3–5)
ALP SERPL-CCNC: 130 U/L — HIGH (ref 40–120)
ALT FLD-CCNC: 20 U/L — SIGNIFICANT CHANGE UP (ref 12–78)
ANION GAP SERPL CALC-SCNC: 6 MMOL/L — SIGNIFICANT CHANGE UP (ref 5–17)
ANISOCYTOSIS BLD QL: SLIGHT — SIGNIFICANT CHANGE UP
APTT BLD: 31.2 SEC — SIGNIFICANT CHANGE UP (ref 27.5–37.4)
AST SERPL-CCNC: 18 U/L — SIGNIFICANT CHANGE UP (ref 15–37)
BASO STIPL BLD QL SMEAR: SLIGHT — SIGNIFICANT CHANGE UP
BASOPHILS # BLD AUTO: 0.1 K/UL — SIGNIFICANT CHANGE UP (ref 0–0.2)
BASOPHILS NFR BLD AUTO: 0.5 % — SIGNIFICANT CHANGE UP (ref 0–2)
BILIRUB SERPL-MCNC: 0.3 MG/DL — SIGNIFICANT CHANGE UP (ref 0.2–1.2)
BUN SERPL-MCNC: 31 MG/DL — HIGH (ref 7–23)
CALCIUM SERPL-MCNC: 8.7 MG/DL — SIGNIFICANT CHANGE UP (ref 8.5–10.1)
CHLORIDE SERPL-SCNC: 104 MMOL/L — SIGNIFICANT CHANGE UP (ref 96–108)
CK SERPL-CCNC: 84 U/L — SIGNIFICANT CHANGE UP (ref 26–308)
CO2 SERPL-SCNC: 26 MMOL/L — SIGNIFICANT CHANGE UP (ref 22–31)
CREAT SERPL-MCNC: 2.53 MG/DL — HIGH (ref 0.5–1.3)
D DIMER BLD IA.RAPID-MCNC: 458 NG/ML DDU — HIGH
DACRYOCYTES BLD QL SMEAR: SLIGHT — SIGNIFICANT CHANGE UP
EOSINOPHIL # BLD AUTO: 0.2 K/UL — SIGNIFICANT CHANGE UP (ref 0–0.5)
EOSINOPHIL NFR BLD AUTO: 1.9 % — SIGNIFICANT CHANGE UP (ref 0–6)
GLUCOSE SERPL-MCNC: 162 MG/DL — HIGH (ref 70–99)
HCT VFR BLD CALC: 24.6 % — LOW (ref 39–50)
HGB BLD-MCNC: 8.5 G/DL — LOW (ref 13–17)
INR BLD: 1.68 RATIO — HIGH (ref 0.88–1.16)
LYMPHOCYTES # BLD AUTO: 1.1 K/UL — SIGNIFICANT CHANGE UP (ref 1–3.3)
LYMPHOCYTES # BLD AUTO: 10.6 % — LOW (ref 13–44)
MACROCYTES BLD QL: SLIGHT — SIGNIFICANT CHANGE UP
MANUAL DIF COMMENT BLD-IMP: SIGNIFICANT CHANGE UP
MCHC RBC-ENTMCNC: 33.3 PG — SIGNIFICANT CHANGE UP (ref 27–34)
MCHC RBC-ENTMCNC: 34.5 GM/DL — SIGNIFICANT CHANGE UP (ref 32–36)
MCV RBC AUTO: 96.5 FL — SIGNIFICANT CHANGE UP (ref 80–100)
MICROCYTES BLD QL: SLIGHT — SIGNIFICANT CHANGE UP
MONOCYTES # BLD AUTO: 0.4 K/UL — SIGNIFICANT CHANGE UP (ref 0–0.9)
MONOCYTES NFR BLD AUTO: 3.7 % — SIGNIFICANT CHANGE UP (ref 2–14)
NEUTROPHILS # BLD AUTO: 8.6 K/UL — HIGH (ref 1.8–7.4)
NEUTROPHILS NFR BLD AUTO: 83.3 % — HIGH (ref 43–77)
NT-PROBNP SERPL-SCNC: 370 PG/ML — HIGH (ref 0–125)
PLAT MORPH BLD: NORMAL — SIGNIFICANT CHANGE UP
PLATELET # BLD AUTO: 152 K/UL — SIGNIFICANT CHANGE UP (ref 150–400)
POIKILOCYTOSIS BLD QL AUTO: SLIGHT — SIGNIFICANT CHANGE UP
POLYCHROMASIA BLD QL SMEAR: SLIGHT — SIGNIFICANT CHANGE UP
POTASSIUM SERPL-MCNC: 4.5 MMOL/L — SIGNIFICANT CHANGE UP (ref 3.5–5.3)
POTASSIUM SERPL-SCNC: 4.5 MMOL/L — SIGNIFICANT CHANGE UP (ref 3.5–5.3)
PROT SERPL-MCNC: 8.4 GM/DL — HIGH (ref 6–8.3)
PROTHROM AB SERPL-ACNC: 18.3 SEC — HIGH (ref 9.8–12.7)
RBC # BLD: 2.55 M/UL — LOW (ref 4.2–5.8)
RBC # FLD: 13.4 % — SIGNIFICANT CHANGE UP (ref 10.3–14.5)
RBC BLD AUTO: (no result)
SODIUM SERPL-SCNC: 136 MMOL/L — SIGNIFICANT CHANGE UP (ref 135–145)
TROPONIN I SERPL-MCNC: 0.1 NG/ML — HIGH (ref 0.01–0.04)
TROPONIN I SERPL-MCNC: 0.11 NG/ML — HIGH (ref 0.01–0.04)
WBC # BLD: 10.3 K/UL — SIGNIFICANT CHANGE UP (ref 3.8–10.5)
WBC # FLD AUTO: 10.3 K/UL — SIGNIFICANT CHANGE UP (ref 3.8–10.5)

## 2017-06-30 PROCEDURE — 70450 CT HEAD/BRAIN W/O DYE: CPT | Mod: 26

## 2017-06-30 PROCEDURE — 99285 EMERGENCY DEPT VISIT HI MDM: CPT

## 2017-06-30 PROCEDURE — 93010 ELECTROCARDIOGRAM REPORT: CPT

## 2017-06-30 PROCEDURE — 71010: CPT | Mod: 26

## 2017-06-30 PROCEDURE — 71250 CT THORAX DX C-: CPT | Mod: 26

## 2017-06-30 RX ORDER — FINASTERIDE 5 MG/1
5 TABLET, FILM COATED ORAL DAILY
Qty: 0 | Refills: 0 | Status: DISCONTINUED | OUTPATIENT
Start: 2017-07-01 | End: 2017-07-03

## 2017-06-30 RX ORDER — SODIUM CHLORIDE 9 MG/ML
1000 INJECTION INTRAMUSCULAR; INTRAVENOUS; SUBCUTANEOUS
Qty: 0 | Refills: 0 | Status: DISCONTINUED | OUTPATIENT
Start: 2017-06-30 | End: 2017-07-01

## 2017-06-30 RX ORDER — PANTOPRAZOLE SODIUM 20 MG/1
40 TABLET, DELAYED RELEASE ORAL
Qty: 0 | Refills: 0 | Status: DISCONTINUED | OUTPATIENT
Start: 2017-07-01 | End: 2017-07-03

## 2017-06-30 RX ORDER — HEPARIN SODIUM 5000 [USP'U]/ML
3500 INJECTION INTRAVENOUS; SUBCUTANEOUS EVERY 6 HOURS
Qty: 0 | Refills: 0 | Status: DISCONTINUED | OUTPATIENT
Start: 2017-06-30 | End: 2017-07-01

## 2017-06-30 RX ORDER — ACETAMINOPHEN 500 MG
650 TABLET ORAL EVERY 6 HOURS
Qty: 0 | Refills: 0 | Status: DISCONTINUED | OUTPATIENT
Start: 2017-06-30 | End: 2017-07-03

## 2017-06-30 RX ORDER — HEPARIN SODIUM 5000 [USP'U]/ML
INJECTION INTRAVENOUS; SUBCUTANEOUS
Qty: 25000 | Refills: 0 | Status: DISCONTINUED | OUTPATIENT
Start: 2017-06-30 | End: 2017-07-01

## 2017-06-30 RX ORDER — APIXABAN 2.5 MG/1
5 TABLET, FILM COATED ORAL
Qty: 0 | Refills: 0 | Status: DISCONTINUED | OUTPATIENT
Start: 2017-06-30 | End: 2017-06-30

## 2017-06-30 RX ORDER — SIMVASTATIN 20 MG/1
20 TABLET, FILM COATED ORAL AT BEDTIME
Qty: 0 | Refills: 0 | Status: DISCONTINUED | OUTPATIENT
Start: 2017-06-30 | End: 2017-07-03

## 2017-06-30 RX ORDER — AMLODIPINE BESYLATE 2.5 MG/1
10 TABLET ORAL DAILY
Qty: 0 | Refills: 0 | Status: DISCONTINUED | OUTPATIENT
Start: 2017-07-01 | End: 2017-07-03

## 2017-06-30 RX ORDER — AMIODARONE HYDROCHLORIDE 400 MG/1
200 TABLET ORAL DAILY
Qty: 0 | Refills: 0 | Status: DISCONTINUED | OUTPATIENT
Start: 2017-07-01 | End: 2017-07-01

## 2017-06-30 RX ORDER — METOPROLOL TARTRATE 50 MG
25 TABLET ORAL DAILY
Qty: 0 | Refills: 0 | Status: DISCONTINUED | OUTPATIENT
Start: 2017-07-01 | End: 2017-07-03

## 2017-06-30 RX ORDER — ZALEPLON 10 MG
5 CAPSULE ORAL AT BEDTIME
Qty: 0 | Refills: 0 | Status: DISCONTINUED | OUTPATIENT
Start: 2017-06-30 | End: 2017-07-03

## 2017-06-30 RX ORDER — TAMSULOSIN HYDROCHLORIDE 0.4 MG/1
0.4 CAPSULE ORAL AT BEDTIME
Qty: 0 | Refills: 0 | Status: DISCONTINUED | OUTPATIENT
Start: 2017-06-30 | End: 2017-07-03

## 2017-06-30 RX ORDER — CEFEPIME 1 G/1
2000 INJECTION, POWDER, FOR SOLUTION INTRAMUSCULAR; INTRAVENOUS ONCE
Qty: 0 | Refills: 0 | Status: COMPLETED | OUTPATIENT
Start: 2017-06-30 | End: 2017-06-30

## 2017-06-30 RX ORDER — SENNA PLUS 8.6 MG/1
2 TABLET ORAL AT BEDTIME
Qty: 0 | Refills: 0 | Status: DISCONTINUED | OUTPATIENT
Start: 2017-06-30 | End: 2017-07-03

## 2017-06-30 RX ORDER — SIMVASTATIN 20 MG/1
0 TABLET, FILM COATED ORAL
Qty: 0 | Refills: 0 | COMMUNITY

## 2017-06-30 RX ORDER — ONDANSETRON 8 MG/1
4 TABLET, FILM COATED ORAL EVERY 6 HOURS
Qty: 0 | Refills: 0 | Status: DISCONTINUED | OUTPATIENT
Start: 2017-06-30 | End: 2017-07-03

## 2017-06-30 RX ORDER — FUROSEMIDE 40 MG
20 TABLET ORAL ONCE
Qty: 0 | Refills: 0 | Status: COMPLETED | OUTPATIENT
Start: 2017-06-30 | End: 2017-06-30

## 2017-06-30 RX ORDER — DOCUSATE SODIUM 100 MG
100 CAPSULE ORAL THREE TIMES A DAY
Qty: 0 | Refills: 0 | Status: DISCONTINUED | OUTPATIENT
Start: 2017-06-30 | End: 2017-07-01

## 2017-06-30 RX ORDER — CEFEPIME 1 G/1
2000 INJECTION, POWDER, FOR SOLUTION INTRAMUSCULAR; INTRAVENOUS EVERY 24 HOURS
Qty: 0 | Refills: 0 | Status: DISCONTINUED | OUTPATIENT
Start: 2017-07-01 | End: 2017-07-03

## 2017-06-30 RX ORDER — SODIUM CHLORIDE 9 MG/ML
3 INJECTION INTRAMUSCULAR; INTRAVENOUS; SUBCUTANEOUS ONCE
Qty: 0 | Refills: 0 | Status: COMPLETED | OUTPATIENT
Start: 2017-06-30 | End: 2017-06-30

## 2017-06-30 RX ORDER — CEFEPIME 1 G/1
INJECTION, POWDER, FOR SOLUTION INTRAMUSCULAR; INTRAVENOUS
Qty: 0 | Refills: 0 | Status: DISCONTINUED | OUTPATIENT
Start: 2017-06-30 | End: 2017-07-03

## 2017-06-30 RX ORDER — ASPIRIN/CALCIUM CARB/MAGNESIUM 324 MG
325 TABLET ORAL ONCE
Qty: 0 | Refills: 0 | Status: COMPLETED | OUTPATIENT
Start: 2017-06-30 | End: 2017-06-30

## 2017-06-30 RX ORDER — HEPARIN SODIUM 5000 [USP'U]/ML
7000 INJECTION INTRAVENOUS; SUBCUTANEOUS EVERY 6 HOURS
Qty: 0 | Refills: 0 | Status: DISCONTINUED | OUTPATIENT
Start: 2017-06-30 | End: 2017-07-01

## 2017-06-30 RX ORDER — VANCOMYCIN HCL 1 G
1000 VIAL (EA) INTRAVENOUS ONCE
Qty: 0 | Refills: 0 | Status: COMPLETED | OUTPATIENT
Start: 2017-06-30 | End: 2017-07-01

## 2017-06-30 RX ADMIN — Medication 20 MILLIGRAM(S): at 11:15

## 2017-06-30 RX ADMIN — TAMSULOSIN HYDROCHLORIDE 0.4 MILLIGRAM(S): 0.4 CAPSULE ORAL at 23:29

## 2017-06-30 RX ADMIN — Medication 5 MILLIGRAM(S): at 23:29

## 2017-06-30 RX ADMIN — HEPARIN SODIUM 7000 UNIT(S): 5000 INJECTION INTRAVENOUS; SUBCUTANEOUS at 22:46

## 2017-06-30 RX ADMIN — HEPARIN SODIUM 1600 UNIT(S)/HR: 5000 INJECTION INTRAVENOUS; SUBCUTANEOUS at 22:41

## 2017-06-30 RX ADMIN — SIMVASTATIN 20 MILLIGRAM(S): 20 TABLET, FILM COATED ORAL at 23:29

## 2017-06-30 RX ADMIN — SODIUM CHLORIDE 3 MILLILITER(S): 9 INJECTION INTRAMUSCULAR; INTRAVENOUS; SUBCUTANEOUS at 11:10

## 2017-06-30 NOTE — ED PROVIDER NOTE - PROGRESS NOTE DETAILS
updated pt, family and daughter (on telephone) pt unable to tolerate vq scan, low suspicion for pe as pt is on eliquis and has ivc filter, second troponin elevated, tba for acs, cardiology called for consult

## 2017-06-30 NOTE — H&P ADULT - PROBLEM SELECTOR PLAN 1
~admit to Telemetry  ~DDx includes recurrent PE vs. progression of CTEPH vs. less likely pericardial effusion  ~case discussed extensively with the patients' Cardiologist Dr. William Beauchamp and oncall Cardiology.. plan is for 2DECHO to further assess for RV strain (patient did not tolerate VQ and is a suboptimal study at this point) and for possible pericardial effusion.  ~f/u CT Chest  ~will stop DOAC therapy for now as the patient has failed DOAC therapy in the past (has had PE/DVTs).  ~will start Heparin gtt  ~cont. supplemental O2 ~admit to Telemetry  ~DDx includes recurrent PE vs. less likely pericardial effusion vs. progression of CTEPH (of note the patient and patients' wife note that a recent CT of the Chest reveal progression of his Metastatic Disease)  ~case discussed extensively with the patients' Cardiologist Dr. William Beauchamp and oncall Cardiology.. plan is for 2DECHO to further assess for RV strain (patient did not tolerate VQ and is a suboptimal study at this point) and for possible pericardial effusion.  ~f/u CT Chest  ~will stop DOAC therapy for now as the patient has failed DOAC therapy in the past (has had PE/DVTs).  ~will start Heparin gtt  ~cont. supplemental O2 ~admit to Telemetry  ~DDx includes recurrent PE vs. less likely pericardial effusion vs. progression of CTEPH (of note the patient and patients' wife note that a recent CT of the Chest reveal progression of his Metastatic Disease) vs. PNA  ~case discussed extensively with the patients' Cardiologist Dr. William Beauchamp and oncall Cardiology.. plan is for 2DECHO to further assess for RV strain (patient did not tolerate VQ and is a suboptimal study at this point) and for possible pericardial effusion.  ~f/u CT Chest  ~will stop DOAC therapy for now as the patient has failed DOAC therapy in the past (has had PE/DVTs).  ~will start Heparin gtt  ~cont. supplemental O2 ~admit to Telemetry  ~DDx includes recurrent PE vs. PNA vs. less likely pericardial effusion vs. progression of CTEPH  ~of note the patient and patients' wife note that a recent CT of the Chest reveal progression of his Metastatic Disease   ~case discussed extensively with the patients' Cardiologist Dr. William Beauchamp and oncall Cardiology.. plan is for 2DECHO to further assess for RV strain (patient did not tolerate VQ and is a suboptimal study at this point) and for possible pericardial effusion.  ~f/u CT Chest  ~will stop DOAC therapy for now as the patient has failed DOAC therapy in the past (has had PE/DVTs).  ~will start Heparin gtt  ~cont. supplemental O2

## 2017-06-30 NOTE — H&P ADULT - PROBLEM SELECTOR PLAN 2
~f/u w/ Cardiology in the am  ~likely due to Related to demand ischemia, anemia and renal insufficiency

## 2017-06-30 NOTE — ED ADULT NURSE REASSESSMENT NOTE - NS ED NURSE REASSESS COMMENT FT1
Received patient in bed, A&Ox3, able to make needs known, pending admission orders. Cardiac monitoring in progress.

## 2017-06-30 NOTE — ED STATDOCS - PROGRESS NOTE DETAILS
70 y/o M with a PMHx of Colon CA presents to the ED c/o worsening SOB that started this morning. Pt states that he experienced a 15 min episode of SOB with no CP with past episodes within the last few weeks. Pt states that he feels okay now, currently calm, able to give adequate hx, denies CP, abd pain, NVD or any other acute c/o at this time with plans to go to the Main for further eval and tx. PMD . I, Jean-Pierre Gutierrez MD, performed the initial face to face bedside interview with this patient regarding history of present illness and determined that the patient should be seen in the main ED.  The history, was documented by the scribe in my presence and I attest to the accuracy of the documentation.

## 2017-06-30 NOTE — ED ADULT NURSE REASSESSMENT NOTE - NS ED NURSE REASSESS COMMENT FT1
pt given late lunch, awaiting orders and bed. Pt remains on 4lnc stating at 100% Will continue to monitor.

## 2017-06-30 NOTE — ED ADULT NURSE REASSESSMENT NOTE - NS ED NURSE REASSESS COMMENT FT1
pt not able to tolerate VQ scan with breathing aparatus. Dr. Miller made aware .Pt awaiting orders and bed. Will continue to monitor.

## 2017-06-30 NOTE — ED PROVIDER NOTE - OBJECTIVE STATEMENT
70 y/o M currently being treated for colon ca with mets to lung (last chemo tx 1 week ago) presents to the ED c/o acute onset dyspnea, sob. Pt's O2 sat 86-88% on room air. Pt is on eliquis and has ivc filter. Denies fever and chills.

## 2017-06-30 NOTE — H&P ADULT - NSHPPHYSICALEXAM_GEN_ALL_CORE
Vital Signs Last 24 Hrs  T(C): 37 (30 Jun 2017 21:02), Max: 37.2 (30 Jun 2017 19:52)  T(F): 98.6 (30 Jun 2017 21:02), Max: 98.9 (30 Jun 2017 19:52)  HR: 80 (30 Jun 2017 21:02) (73 - 80)  BP: 158/72 (30 Jun 2017 21:02) (143/72 - 161/95)  RR: 18 (30 Jun 2017 21:02) (16 - 27)  SpO2: 100% (30 Jun 2017 21:02) (96% - 100%)

## 2017-06-30 NOTE — ED PROVIDER NOTE - CARE PLAN
Principal Discharge DX:	Dyspnea, unspecified type  Secondary Diagnosis:	Elevated troponin  Secondary Diagnosis:	Anemia

## 2017-06-30 NOTE — H&P ADULT - ASSESSMENT
70 y/o M PMHx significant for metastatic colon cancer (lung, liver) receiving chemotherapy ([facility] trial), hx of AFib/AFlutter, recent PE, DVT (on DOAC therapy), HTN, HLD, and glaucoma who presents to the ED w/ c/o progressively worsening intermittent episodes of shortness of breath over the last several weeks w/ abrupt worsening this morning. In triage the patient was hypoxic SaO2 was 86-88% which improved on NRB now on NC via 2L/min. Labs were notable for Hbg/Hct 8.5/24.6, INR 1.68, BUN/Cr 31/2.53, (was 20/1.73), TnI 0.097 -> 0.106. Of note the patient denies any symptoms of chest pain, or palpitations and recently completed his last course 1 week ago at [facility]. The patient denies any recent sick contacts, denies cough, fevers, myalgias/arthralgias.

## 2017-06-30 NOTE — H&P ADULT - HISTORY OF PRESENT ILLNESS
72 y/o M PMHx significant for metastatic colon cancer (lung, liver) receiving chemotherapy ([facility] trial), hx of AFib/AFlutter, recent PE, DVT (on DOAC), HTN, HLD, and glaucoma who presents to the ED w/ c/o progressively worsening intermittent episodes of shortness of breath over the last several weeks w/ abrupt worsening this morning. In triage the patient was hypoxic SaO2 was 86-88% which improved on NRB now on NC via 2L/min. Labs were notable for Hbg/Hct 8.5/24.6, INR 1.68, BUN/Cr 31/2.53, (was 20/1.73), TnI 0.097 -> 0.106. Of note the patient denies any symptoms of chest pain, or palpitations and recently completed his last course 1 week ago at [facility]. The patient denies any recent sick contacts, denies cough, fevers, myalgias/arthralgias.

## 2017-07-01 DIAGNOSIS — C18.9 MALIGNANT NEOPLASM OF COLON, UNSPECIFIED: ICD-10-CM

## 2017-07-01 DIAGNOSIS — D64.81 ANEMIA DUE TO ANTINEOPLASTIC CHEMOTHERAPY: ICD-10-CM

## 2017-07-01 LAB
ALBUMIN SERPL ELPH-MCNC: 3 G/DL — LOW (ref 3.3–5)
ALP SERPL-CCNC: 116 U/L — SIGNIFICANT CHANGE UP (ref 40–120)
ALT FLD-CCNC: 18 U/L — SIGNIFICANT CHANGE UP (ref 12–78)
ANION GAP SERPL CALC-SCNC: 9 MMOL/L — SIGNIFICANT CHANGE UP (ref 5–17)
APTT BLD: 118.4 SEC — HIGH (ref 27.5–37.4)
APTT BLD: 74.4 SEC — HIGH (ref 27.5–37.4)
AST SERPL-CCNC: 16 U/L — SIGNIFICANT CHANGE UP (ref 15–37)
BASOPHILS # BLD AUTO: 0.1 K/UL — SIGNIFICANT CHANGE UP (ref 0–0.2)
BASOPHILS NFR BLD AUTO: 0.6 % — SIGNIFICANT CHANGE UP (ref 0–2)
BILIRUB SERPL-MCNC: 0.4 MG/DL — SIGNIFICANT CHANGE UP (ref 0.2–1.2)
BLD GP AB SCN SERPL QL: SIGNIFICANT CHANGE UP
BUN SERPL-MCNC: 25 MG/DL — HIGH (ref 7–23)
CALCIUM SERPL-MCNC: 8.4 MG/DL — LOW (ref 8.5–10.1)
CHLORIDE SERPL-SCNC: 104 MMOL/L — SIGNIFICANT CHANGE UP (ref 96–108)
CO2 SERPL-SCNC: 25 MMOL/L — SIGNIFICANT CHANGE UP (ref 22–31)
CREAT SERPL-MCNC: 2 MG/DL — HIGH (ref 0.5–1.3)
EOSINOPHIL # BLD AUTO: 0.3 K/UL — SIGNIFICANT CHANGE UP (ref 0–0.5)
EOSINOPHIL NFR BLD AUTO: 3.6 % — SIGNIFICANT CHANGE UP (ref 0–6)
GLUCOSE SERPL-MCNC: 118 MG/DL — HIGH (ref 70–99)
HCT VFR BLD CALC: 22.1 % — LOW (ref 39–50)
HGB BLD-MCNC: 7.4 G/DL — LOW (ref 13–17)
INR BLD: 1.56 RATIO — HIGH (ref 0.88–1.16)
LYMPHOCYTES # BLD AUTO: 1 K/UL — SIGNIFICANT CHANGE UP (ref 1–3.3)
LYMPHOCYTES # BLD AUTO: 11.1 % — LOW (ref 13–44)
MAGNESIUM SERPL-MCNC: 1.8 MG/DL — SIGNIFICANT CHANGE UP (ref 1.6–2.6)
MCHC RBC-ENTMCNC: 32.5 PG — SIGNIFICANT CHANGE UP (ref 27–34)
MCHC RBC-ENTMCNC: 33.6 GM/DL — SIGNIFICANT CHANGE UP (ref 32–36)
MCV RBC AUTO: 96.7 FL — SIGNIFICANT CHANGE UP (ref 80–100)
MONOCYTES # BLD AUTO: 0.3 K/UL — SIGNIFICANT CHANGE UP (ref 0–0.9)
MONOCYTES NFR BLD AUTO: 3.7 % — SIGNIFICANT CHANGE UP (ref 2–14)
NEUTROPHILS # BLD AUTO: 7.6 K/UL — HIGH (ref 1.8–7.4)
NEUTROPHILS NFR BLD AUTO: 81 % — HIGH (ref 43–77)
PLATELET # BLD AUTO: 123 K/UL — LOW (ref 150–400)
POTASSIUM SERPL-MCNC: 4.1 MMOL/L — SIGNIFICANT CHANGE UP (ref 3.5–5.3)
POTASSIUM SERPL-SCNC: 4.1 MMOL/L — SIGNIFICANT CHANGE UP (ref 3.5–5.3)
PROT SERPL-MCNC: 7.2 GM/DL — SIGNIFICANT CHANGE UP (ref 6–8.3)
PROTHROM AB SERPL-ACNC: 17 SEC — HIGH (ref 9.8–12.7)
RBC # BLD: 2.29 M/UL — LOW (ref 4.2–5.8)
RBC # FLD: 13.4 % — SIGNIFICANT CHANGE UP (ref 10.3–14.5)
SODIUM SERPL-SCNC: 138 MMOL/L — SIGNIFICANT CHANGE UP (ref 135–145)
TYPE + AB SCN PNL BLD: SIGNIFICANT CHANGE UP
WBC # BLD: 9.4 K/UL — SIGNIFICANT CHANGE UP (ref 3.8–10.5)
WBC # FLD AUTO: 9.4 K/UL — SIGNIFICANT CHANGE UP (ref 3.8–10.5)

## 2017-07-01 PROCEDURE — 93306 TTE W/DOPPLER COMPLETE: CPT | Mod: 26

## 2017-07-01 RX ORDER — DOCUSATE SODIUM 100 MG
200 CAPSULE ORAL DAILY
Qty: 0 | Refills: 0 | Status: DISCONTINUED | OUTPATIENT
Start: 2017-07-01 | End: 2017-07-03

## 2017-07-01 RX ORDER — APIXABAN 2.5 MG/1
5 TABLET, FILM COATED ORAL
Qty: 0 | Refills: 0 | Status: DISCONTINUED | OUTPATIENT
Start: 2017-07-01 | End: 2017-07-03

## 2017-07-01 RX ORDER — LATANOPROST 0.05 MG/ML
1 SOLUTION/ DROPS OPHTHALMIC; TOPICAL AT BEDTIME
Qty: 0 | Refills: 0 | Status: DISCONTINUED | OUTPATIENT
Start: 2017-07-01 | End: 2017-07-03

## 2017-07-01 RX ORDER — LACTOBACILLUS ACIDOPHILUS 100MM CELL
2 CAPSULE ORAL
Qty: 0 | Refills: 0 | Status: DISCONTINUED | OUTPATIENT
Start: 2017-07-01 | End: 2017-07-03

## 2017-07-01 RX ADMIN — Medication 200 MILLIGRAM(S): at 17:17

## 2017-07-01 RX ADMIN — APIXABAN 5 MILLIGRAM(S): 2.5 TABLET, FILM COATED ORAL at 17:17

## 2017-07-01 RX ADMIN — CEFEPIME 100 MILLIGRAM(S): 1 INJECTION, POWDER, FOR SOLUTION INTRAMUSCULAR; INTRAVENOUS at 00:11

## 2017-07-01 RX ADMIN — Medication 650 MILLIGRAM(S): at 07:55

## 2017-07-01 RX ADMIN — SIMVASTATIN 20 MILLIGRAM(S): 20 TABLET, FILM COATED ORAL at 21:22

## 2017-07-01 RX ADMIN — Medication 2 TABLET(S): at 17:17

## 2017-07-01 RX ADMIN — Medication 250 MILLIGRAM(S): at 00:53

## 2017-07-01 RX ADMIN — CEFEPIME 100 MILLIGRAM(S): 1 INJECTION, POWDER, FOR SOLUTION INTRAMUSCULAR; INTRAVENOUS at 22:25

## 2017-07-01 RX ADMIN — HEPARIN SODIUM 1400 UNIT(S)/HR: 5000 INJECTION INTRAVENOUS; SUBCUTANEOUS at 05:41

## 2017-07-01 RX ADMIN — FINASTERIDE 5 MILLIGRAM(S): 5 TABLET, FILM COATED ORAL at 11:55

## 2017-07-01 RX ADMIN — Medication 25 MILLIGRAM(S): at 05:44

## 2017-07-01 RX ADMIN — PANTOPRAZOLE SODIUM 40 MILLIGRAM(S): 20 TABLET, DELAYED RELEASE ORAL at 06:29

## 2017-07-01 RX ADMIN — AMLODIPINE BESYLATE 10 MILLIGRAM(S): 2.5 TABLET ORAL at 05:44

## 2017-07-01 RX ADMIN — LATANOPROST 1 DROP(S): 0.05 SOLUTION/ DROPS OPHTHALMIC; TOPICAL at 21:22

## 2017-07-01 RX ADMIN — SODIUM CHLORIDE 60 MILLILITER(S): 9 INJECTION INTRAMUSCULAR; INTRAVENOUS; SUBCUTANEOUS at 00:53

## 2017-07-01 RX ADMIN — ONDANSETRON 4 MILLIGRAM(S): 8 TABLET, FILM COATED ORAL at 07:54

## 2017-07-01 RX ADMIN — TAMSULOSIN HYDROCHLORIDE 0.4 MILLIGRAM(S): 0.4 CAPSULE ORAL at 21:22

## 2017-07-01 RX ADMIN — AMIODARONE HYDROCHLORIDE 200 MILLIGRAM(S): 400 TABLET ORAL at 05:44

## 2017-07-01 NOTE — CONSULT NOTE ADULT - ASSESSMENT
71M with metastatic colon cancer, mets to liver, lung, peritoneum, initially treated with FOLFOX, has had liver resections x 2 in the past, now just off trial at [facility] with FOLFIRI + Reolysin (last dose last week) due to POD in lung, here with SOB and possible PNA on treatment.  Patient has a hx of PE and was on Apixaban, on heparin gtt here due to concern ofr possible progression of PE.  Patient with ELIANA per wife due to chemo

## 2017-07-01 NOTE — CONSULT NOTE ADULT - ASSESSMENT
71 year old male presented to the hospital with a history of Lung Ca, HTN, HLD, arryhmia, CKD with baseline renal function near 2.0 mg/dl (chemo induced), BPH with renal evaluation of ELIANA on CKD in the setting of pneumonia. ELIANA was likely pre-renal , now stabilized and back to baseline. Baseline appears to be chemo induced, has history of urinary retention which may play a role in CKD as well.    CKD/ELIANA  -Renal function back to baseline (1.9-2.1)  -Ok to stop IVF, taking good po  -Renal imaging, has history of urinary retention  -Urine studies, urine lytes and protein:creatinine  -NSAID avoidance, dose meds for crcl ~35 ml/min    Pneumonia  -ID  -ABX  -Follow up cultures    Anemia  -They do not report getting SHIRA in the past  -May need PRBC, defer to medical team    d/c with family  d/c with Dr. Rosa    Thanks, will follow with you

## 2017-07-01 NOTE — CONSULT NOTE ADULT - ASSESSMENT
70 y/o M PMHx significant for metastatic colon cancer (lung, liver) receiving chemotherapy ([facility] trial), hx of AFib/AFlutter, recent PE, DVT (on DOAC), HTN, HLD, and glaucoma admitted 6/30 w/ c/o progressively worsening intermittent episodes of shortness of breath over the last several weeks w/ abrupt worsening this morning. In ER, patient was hypoxic  to 86-88% which improved on NRB and then nasal cannula. P recently chemo last week. Reports occasional cough, denies any recent sick contacts, fevers, myalgias/arthralgias. Here, afebrile, normal wbc ct, CT chest with bilateral groundglass opacities in lower lobes ? PNA, was given IV vancomycin/cefepime d/t concern for infection.    1. hypoxic resp failure/hcap/metastatic colon ca/ELIANA on CKD    - improving    - agree with IV cefepime 2gm q24h for now #2    - hold further vancomycin    - recent chemo and noted to have progression of cancer on recent imaging    - f/u cultures    -monitor temps    - -tolerating abx well so far; no side effects noted    -reason for abx use and side effects reviewed with patient    2. other issues - metastatic colon cancer (lung, liver) receiving chemotherapy ([facility] trial), hx of AFib/AFlutter, recent PE, DVT (on DOAC), HTN, HLD, and glaucoma - care per medicine

## 2017-07-01 NOTE — PROGRESS NOTE ADULT - SUBJECTIVE AND OBJECTIVE BOX
72 y/o M PMHx significant for metastatic colon cancer (lung, liver) receiving chemotherapy ([facility] trial), hx of AFib/AFlutter, recent PE, DVT (on DOAC), HTN, HLD, and glaucoma who presents to the ED w/ c/o progressively worsening intermittent episodes of shortness of breath over the last several weeks w/ abrupt worsening this morning. In triage the patient was hypoxic SaO2 was 86-88% which improved on NRB now on NC via 2L/min. Labs were notable for Hbg/Hct 8.5/24.6, INR 1.68, BUN/Cr 31/2.53, (was 20/1.73), TnI 0.097 -> 0.106. Of note the patient denies any symptoms of chest pain, or palpitations and recently completed his last course 1 week ago at [facility]. The patient denies any recent sick contacts, denies cough, fevers, myalgias/arthralgias.       7/1: No O/N events. Low-grade fever this AM, but feels much better overall. Discussed timing of DVT recurrence @ length, as well as concerns re: pulm toxicity d/t amiodarone. Remains on supplemental O2.     Physical Exam:  Physical Exam: Vital Signs Last 24 Hrs T(C): 37 (30 Jun 2017 21:02), Max: 37.2 (30 Jun 2017 19:52) T(F): 98.6 (30 Jun 2017 21:02), Max: 98.9 (30 Jun 2017 19:52) HR: 80 (30 Jun 2017 21:02) (73 - 80) BP: 158/72 (30 Jun 2017 21:02) (143/72 - 161/95) RR: 18 (30 Jun 2017 21:02) (16 - 27) SpO2: 100% (30 Jun 2017 21:02) (96% - 100%)	    Physical Exam:  · Constitutional	detailed exam	  · Constitutional Details	respiratory distress	  · Respiratory Distress	mild	  · Eyes	detailed exam	  · Eyes Details	PERRL	  · ENMT	not examined	  · Neck	detailed exam	  · Neck Details	supple; no JVD	  · Breasts	not examined	  · Back	not examined	  · Respiratory	detailed exam	  · Respiratory Details	good air movement; clear to auscultation bilaterally	  · Cardiovascular	detailed exam	  · Cardiovascular Details	regular rate and rhythm	  · Cardiovascular Details	positive S1; positive S2	  · Gastrointestinal	detailed exam	  · GI Normal	soft; nontender; no distention	  · Genitourinary	patient refused	  · Rectal	patient refused	  · Extremities	detailed exam	  · Extremities Details	no clubbing; no cyanosis	  · Vascular	Equal and normal pulses (carotid, femoral, dorsalis pedis)	  · Neurological	Alert & oriented; no sensory, motor or coordination deficits, normal reflexes	  · Skin	No lesions; no rash	  · Lymph Nodes	No lymphadedenopathy	  · Musculoskeletal	No joint pain, swelling or deformity; no limitation of movement	      Laboratory:   T(C): 37.9 (07-01-17 @ 10:58), Max: 38.3 (07-01-17 @ 07:55) HR: 72 (07-01-17 @ 10:58) (72 - 84) BP: 129/64 (07-01-17 @ 10:58) (129/64 - 165/79) RR: 18 (07-01-17 @ 10:58) (18 - 27) SpO2: 96% (07-01-17 @ 10:58) (94% - 100%) Wt(kg): --  CARDIAC MARKERS ( 30 Jun 2017 13:46 ) 0.106 ng/mL / x     / x     / x     / x     CARDIAC MARKERS ( 30 Jun 2017 10:46 ) 0.097 ng/mL / x     / 84 U/L / x     / x                            7.4   9.4   )-----------( 123      ( 01 Jul 2017 04:21 )            22.1   01 Jul 2017 04:21  138    |  104    |  25    ----------------------------<  118   4.1     |  25     |  2.00   Ca    8.4        01 Jul 2017 04:21 Mg     1.8       01 Jul 2017 04:21  TPro  7.2    /  Alb  3.0    /  TBili  0.4    /  DBili  x      /  AST  16     /  ALT  18     /  AlkPhos  116    01 Jul 2017 04:21  PT/INR - ( 01 Jul 2017 04:21 )   PT: 17.0 sec;   INR: 1.56 ratio      PTT - ( 01 Jul 2017 11:29 )  PTT:74.4 sec CAPILLARY BLOOD GLUCOSE    LIVER FUNCTIONS - ( 01 Jul 2017 04:21 ) Alb: 3.0 g/dL / Pro: 7.2 gm/dL / ALK PHOS: 116 U/L / ALT: 18 U/L / AST: 16 U/L / GGT: x        	  	  	  	  	  	  	  	  	  	  	  	  	  	  	  	  	  	  	  	  	  	  	  	  	  	  	  	  	  	  	  	  	  	  	  	  	  	  	  	  	  	  	  	  	  	  	  	  	  	  	  	  	  	  	  	  	  	  	    Assessment and Plan:   Assessment:  · Assessment		  72 y/o M PMHx significant for metastatic colon cancer (lung, liver) receiving chemotherapy ([facility] trial), hx of AFib/AFlutter, recent PE, DVT (on DOAC therapy), HTN, HLD, and glaucoma who presents to the ED w/ c/o progressively worsening intermittent episodes of shortness of breath over the last several weeks w/ abrupt worsening this morning. In triage the patient was hypoxic SaO2 was 86-88% which improved on NRB now on NC via 2L/min. Labs were notable for Hbg/Hct 8.5/24.6, INR 1.68, BUN/Cr 31/2.53, (was 20/1.73), TnI 0.097 -> 0.106. Of note the patient denies any symptoms of chest pain, or palpitations and recently completed his last course 1 week ago at [facility]. The patient denies any recent sick contacts, denies cough, fevers, myalgias/arthralgias.       ·  Hypoxemic respiratory primarily 2/2 HCAP, likely Gram (-) or other resistant organisms, a/w advanced pulmonary metastasis with colon primary; recurrent DVT occurred while OFF of NOAC & not considered a therapeutic failure; ? PE but Pt intolerant to V/Q scan, unable to use CTA- moot point though, will require lifelong AC  - c/w abx coverage  - O2 support  - resume Eliquis- stop heparin gtt @ same time  - f/u Cx's   - D/C amiodarone d/t family concerns of pulmonary toxicity in light of metastatic disease    ·  Elevated troponin, demand ischemia with CKD     ·  ELIANA on CKD stage III- resolved   - avoid nephrotoxins    ·  Acute on chronic anemia 2/2 malignancy, CKD, recent chemo  - transfuse 1U PRBC's- goal Hb 8-9      ·  HTN (hypertension).  Plan: ~cont. Amlodipine 10mg po daily.       ·	Afib, currently NSR  - D/C amiodarone, anticipate 6-8 week washout period  - c/w BB--> may need uptitration @ some point    ·   Glaucoma of both eyes, unspecified glaucoma.  Plan: ~and Lumigan and Latanoprost.       * DVT PPx- Eliquis

## 2017-07-02 DIAGNOSIS — I48.0 PAROXYSMAL ATRIAL FIBRILLATION: ICD-10-CM

## 2017-07-02 DIAGNOSIS — I10 ESSENTIAL (PRIMARY) HYPERTENSION: ICD-10-CM

## 2017-07-02 DIAGNOSIS — R06.02 SHORTNESS OF BREATH: ICD-10-CM

## 2017-07-02 LAB
ALBUMIN SERPL ELPH-MCNC: 2.9 G/DL — LOW (ref 3.3–5)
ALP SERPL-CCNC: 104 U/L — SIGNIFICANT CHANGE UP (ref 40–120)
ALT FLD-CCNC: 14 U/L — SIGNIFICANT CHANGE UP (ref 12–78)
ANION GAP SERPL CALC-SCNC: 10 MMOL/L — SIGNIFICANT CHANGE UP (ref 5–17)
AST SERPL-CCNC: 17 U/L — SIGNIFICANT CHANGE UP (ref 15–37)
BILIRUB SERPL-MCNC: 0.5 MG/DL — SIGNIFICANT CHANGE UP (ref 0.2–1.2)
BUN SERPL-MCNC: 29 MG/DL — HIGH (ref 7–23)
CALCIUM SERPL-MCNC: 8.6 MG/DL — SIGNIFICANT CHANGE UP (ref 8.5–10.1)
CHLORIDE SERPL-SCNC: 102 MMOL/L — SIGNIFICANT CHANGE UP (ref 96–108)
CO2 SERPL-SCNC: 27 MMOL/L — SIGNIFICANT CHANGE UP (ref 22–31)
CREAT SERPL-MCNC: 2.38 MG/DL — HIGH (ref 0.5–1.3)
GLUCOSE SERPL-MCNC: 104 MG/DL — HIGH (ref 70–99)
HCT VFR BLD CALC: 23.8 % — LOW (ref 39–50)
HGB BLD-MCNC: 8 G/DL — LOW (ref 13–17)
MAGNESIUM SERPL-MCNC: 2 MG/DL — SIGNIFICANT CHANGE UP (ref 1.6–2.6)
MCHC RBC-ENTMCNC: 31.9 PG — SIGNIFICANT CHANGE UP (ref 27–34)
MCHC RBC-ENTMCNC: 33.6 GM/DL — SIGNIFICANT CHANGE UP (ref 32–36)
MCV RBC AUTO: 94.9 FL — SIGNIFICANT CHANGE UP (ref 80–100)
PHOSPHATE SERPL-MCNC: 3.1 MG/DL — SIGNIFICANT CHANGE UP (ref 2.5–4.5)
PLATELET # BLD AUTO: 120 K/UL — LOW (ref 150–400)
POTASSIUM SERPL-MCNC: 4.2 MMOL/L — SIGNIFICANT CHANGE UP (ref 3.5–5.3)
POTASSIUM SERPL-SCNC: 4.2 MMOL/L — SIGNIFICANT CHANGE UP (ref 3.5–5.3)
PROT SERPL-MCNC: 7.6 GM/DL — SIGNIFICANT CHANGE UP (ref 6–8.3)
RBC # BLD: 2.5 M/UL — LOW (ref 4.2–5.8)
RBC # FLD: 15 % — HIGH (ref 10.3–14.5)
SODIUM SERPL-SCNC: 139 MMOL/L — SIGNIFICANT CHANGE UP (ref 135–145)
WBC # BLD: 10.9 K/UL — HIGH (ref 3.8–10.5)
WBC # FLD AUTO: 10.9 K/UL — HIGH (ref 3.8–10.5)

## 2017-07-02 PROCEDURE — 99223 1ST HOSP IP/OBS HIGH 75: CPT

## 2017-07-02 RX ADMIN — LATANOPROST 1 DROP(S): 0.05 SOLUTION/ DROPS OPHTHALMIC; TOPICAL at 21:43

## 2017-07-02 RX ADMIN — APIXABAN 5 MILLIGRAM(S): 2.5 TABLET, FILM COATED ORAL at 17:19

## 2017-07-02 RX ADMIN — APIXABAN 5 MILLIGRAM(S): 2.5 TABLET, FILM COATED ORAL at 05:32

## 2017-07-02 RX ADMIN — Medication 25 MILLIGRAM(S): at 05:32

## 2017-07-02 RX ADMIN — PANTOPRAZOLE SODIUM 40 MILLIGRAM(S): 20 TABLET, DELAYED RELEASE ORAL at 06:18

## 2017-07-02 RX ADMIN — Medication 2 TABLET(S): at 17:19

## 2017-07-02 RX ADMIN — CEFEPIME 100 MILLIGRAM(S): 1 INJECTION, POWDER, FOR SOLUTION INTRAMUSCULAR; INTRAVENOUS at 23:45

## 2017-07-02 RX ADMIN — TAMSULOSIN HYDROCHLORIDE 0.4 MILLIGRAM(S): 0.4 CAPSULE ORAL at 21:43

## 2017-07-02 RX ADMIN — FINASTERIDE 5 MILLIGRAM(S): 5 TABLET, FILM COATED ORAL at 12:01

## 2017-07-02 RX ADMIN — SIMVASTATIN 20 MILLIGRAM(S): 20 TABLET, FILM COATED ORAL at 22:32

## 2017-07-02 RX ADMIN — Medication 2 TABLET(S): at 12:01

## 2017-07-02 RX ADMIN — AMLODIPINE BESYLATE 10 MILLIGRAM(S): 2.5 TABLET ORAL at 05:32

## 2017-07-02 NOTE — CONSULT NOTE ADULT - PROBLEM SELECTOR RECOMMENDATION 2
Maintaining sinus rhythm; reasonable to d/c amiodarone; continue anticoagulation.
Agree with heparin gtt for now, agree with imaging to try to assess if progression of PE.  Would continue to hold apixaban for two reasons - 1) martin and 2) concern for progression of clot while on apixaban.    Should the patient need to be converted from heparin gtt, in light of renal function perhaps coumadin would be best option.

## 2017-07-02 NOTE — PROGRESS NOTE ADULT - ASSESSMENT
71 year old male presented to the hospital with a history of Lung Ca, HTN, HLD, arrythmia, CKD with baseline renal function near 2.0 mg/dl (chemo induced), BPH with renal evaluation of ELIANA on CKD in the setting of pneumonia. ELIANA was likely pre-renal , now stabilized and back to baseline. Baseline appears to be chemo induced, has history of urinary retention which may play a role in CKD as well.    CKD/ELIANA  -Renal function fluctuating, base is low 2's per family.  -Ok to keep off of IVF, taking good po  -Renal imaging still pending, has history of urinary retention. Bedside bladder scan with PVR if function continues to rise and formal sono delayed  -Urine studies, urine lytes and protein:creatinine  -NSAID avoidance, dose meds for crcl ~35 ml/min    Pneumonia  -ID  -ABX  -Follow up cultures    Anemia  -They do not report getting SHIRA in the past  -PRBC defer to medical team/Heme

## 2017-07-02 NOTE — PROGRESS NOTE ADULT - SUBJECTIVE AND OBJECTIVE BOX
Patient is a 71y Male who reports no complaints overnight. Taking po, breathing and cough better but persistent.    REVIEW OF SYSTEMS:    CONSTITUTIONAL: No weakness, fevers or chills  RESPIRATORY: No cough, wheezing, hemoptysis; No shortness of breath  CARDIOVASCULAR: No chest pain or palpitations  GENITOURINARY: No dysuria, frequency or hematuria  All other review of systems is negative unless indicated above.    MEDICATIONS  (STANDING):  finasteride 5 milliGRAM(s) Oral daily  tamsulosin 0.4 milliGRAM(s) Oral at bedtime  simvastatin 20 milliGRAM(s) Oral at bedtime  metoprolol succinate ER 25 milliGRAM(s) Oral daily  amLODIPine   Tablet 10 milliGRAM(s) Oral daily  pantoprazole    Tablet 40 milliGRAM(s) Oral before breakfast  cefepime  IVPB 2000 milliGRAM(s) IV Intermittent every 24 hours  cefepime  IVPB   IV Intermittent   docusate sodium 200 milliGRAM(s) Oral daily  apixaban 5 milliGRAM(s) Oral two times a day  lactobacillus acidophilus 2 Tablet(s) Oral two times a day with meals  latanoprost 0.005% Ophthalmic Solution 1 Drop(s) Both EYES at bedtime    MEDICATIONS  (PRN):  acetaminophen   Tablet 650 milliGRAM(s) Oral every 6 hours PRN For Temp greater than 38 C (100.4 F)  acetaminophen   Tablet. 650 milliGRAM(s) Oral every 6 hours PRN Mild Pain (1 - 3)  ondansetron Injectable 4 milliGRAM(s) IV Push every 6 hours PRN Nausea  senna 2 Tablet(s) Oral at bedtime PRN Constipation  zaleplon 5 milliGRAM(s) Oral at bedtime PRN Insomnia        T(C): , Max: 37.9 (07-01-17 @ 10:58)  T(F): , Max: 100.2 (07-01-17 @ 10:58)  HR: 80 (07-02-17 @ 05:25)  BP: 143/80 (07-02-17 @ 05:25)  BP(mean): --  RR: 17 (07-02-17 @ 05:25)  SpO2: 99% (07-02-17 @ 05:25)  Wt(kg): --    07-01 @ 07:01  -  07-02 @ 07:00  --------------------------------------------------------  IN: 1211 mL / OUT: 800 mL / NET: 411 mL          PHYSICAL EXAM:    Constitutional: NAD  HEENT: PERRLA, EOMI,  MMM  Neck: No LAD, No JVD  Respiratory: scatt lucian  Cardiovascular: S1 and S2, RRR  Gastrointestinal: BS+, soft, NT/ND  Extremities: No peripheral edema  Neurological: A/O x 3, no focal deficits  Psychiatric: Normal mood, normal affect  : No Lau  Skin: No rashes  Access: Not applicable        LABS:                        8.0    10.9  )-----------( 120      ( 02 Jul 2017 06:07 )             23.8     02 Jul 2017 06:07    139    |  102    |  29     ----------------------------<  104    4.2     |  27     |  2.38   01 Jul 2017 04:21    138    |  104    |  25     ----------------------------<  118    4.1     |  25     |  2.00   30 Jun 2017 10:46    136    |  104    |  31     ----------------------------<  162    4.5     |  26     |  2.53     Ca    8.6        02 Jul 2017 06:07  Ca    8.4        01 Jul 2017 04:21  Ca    8.7        30 Jun 2017 10:46  Phos  3.1       02 Jul 2017 06:07  Mg     2.0       02 Jul 2017 06:07  Mg     1.8       01 Jul 2017 04:21    TPro  7.6    /  Alb  2.9    /  TBili  0.5    /  DBili  x      /  AST  17     /  ALT  14     /  AlkPhos  104    02 Jul 2017 06:07  TPro  7.2    /  Alb  3.0    /  TBili  0.4    /  DBili  x      /  AST  16     /  ALT  18     /  AlkPhos  116    01 Jul 2017 04:21  TPro  8.4    /  Alb  3.5    /  TBili  0.3    /  DBili  x      /  AST  18     /  ALT  20     /  AlkPhos  130    30 Jun 2017 10:46          Urine Studies:          RADIOLOGY & ADDITIONAL STUDIES:

## 2017-07-02 NOTE — CONSULT NOTE ADULT - PROBLEM SELECTOR RECOMMENDATION 3
BP is controlled.
Noted anemia - patient is in the window for cytopenias due to treatment (last tx one week ago)    would favor 1 U PRBC today    Appreciate care per medicine/ID/Cardiology/Nephrology  Thank you for allowing us to participate in the care of this patient

## 2017-07-02 NOTE — CONSULT NOTE ADULT - SUBJECTIVE AND OBJECTIVE BOX
Patient is a 71y old  Male who presents with a chief complaint of Shortness of Breath     HPI:  72 y/o M PMHx significant for metastatic colon cancer (lung, liver) initially treated at [facility] with FOLFOX and has had liver resection x 2, then had POD and started trial at [facility] FOLFIRI +Reolysin, last tx last week, CT scan on 6/27/17 with POD in lung, now off trial due to POD, admitted 6/30 w/ c/o progressively worsening intermittent episodes of shortness of breath over the last several weeks w/ abrupt worsening this morning. In ER, patient was hypoxic  to 86-88% which improved on NRB and then nasal cannula. P recently chemo last week. Reports occasional cough, denies any recent sick contacts, fevers, myalgias/arthralgias. Here, afebrile, normal wbc ct, CT chest with bilateral groundglass opacities in lower lobes ? PNA, was given IV vancomycin/cefepime d/t concern for infection.      PMH: hx of AFib/AFlutter, recent PE, DVT (on DOAC), HTN, HLD, and glaucoma     PSH: as above    Meds: per reconciliation sheet, noted below    MEDICATIONS  (STANDING):  finasteride 5 milliGRAM(s) Oral daily  tamsulosin 0.4 milliGRAM(s) Oral at bedtime  amiodarone    Tablet 200 milliGRAM(s) Oral daily  simvastatin 20 milliGRAM(s) Oral at bedtime  metoprolol succinate ER 25 milliGRAM(s) Oral daily  amLODIPine   Tablet 10 milliGRAM(s) Oral daily  pantoprazole    Tablet 40 milliGRAM(s) Oral before breakfast  heparin  Infusion.  Unit(s)/Hr (16 mL/Hr) IV Continuous <Continuous>  cefepime  IVPB 2000 milliGRAM(s) IV Intermittent every 24 hours  cefepime  IVPB   IV Intermittent   sodium chloride 0.9%. 1000 milliLiter(s) (60 mL/Hr) IV Continuous <Continuous>      Allergies    penicillin (Unknown)  penicillins (Rash)    Intolerances        Social: no smoking, no alcohol, no illegal drugs; no recent travel, no exposure to TB    Family history:  No pertinent family history in first degree relatives      ROS: the patient denies fever, no chills, no HA, no dizziness, no sore throat, no blurry vision, no CP, no palpitations, no abdominal pain, no diarrhea, no N/V, no dysuria, no leg pain, no claudication, no rash, no joint aches, no rectal pain or bleeding, no night sweats    Vital Signs Last 24 Hrs  T(C): 37.9 (01 Jul 2017 10:58), Max: 38.3 (01 Jul 2017 07:55)  T(F): 100.2 (01 Jul 2017 10:58), Max: 101 (01 Jul 2017 07:55)  HR: 72 (01 Jul 2017 10:58) (72 - 84)  BP: 129/64 (01 Jul 2017 10:58) (129/64 - 165/79)  BP(mean): 100 (30 Jun 2017 22:41) (100 - 100)  RR: 18 (01 Jul 2017 10:58) (16 - 27)  SpO2: 96% (01 Jul 2017 10:58) (94% - 100%)      PE:  Constitutional: frail looking  HEENT: NC/AT, EOMI, PERRLA  Neck: supple  Back: no tenderness  Respiratory: decreased breath sounds  Cardiovascular: S1S2 regular, no murmurs  Abdomen: soft, not tender, not distended, positive BS  Genitourinary: deferred  Rectal: deferred  Musculoskeletal: no muscle tenderness, no joint swelling or tenderness  Extremities: no pedal edema  Neurological:  no focal deficits  Skin: no rashes    Labs:                        7.4    9.4   )-----------( 123      ( 01 Jul 2017 04:21 )             22.1     07-01    138  |  104  |  25<H>  ----------------------------<  118<H>  4.1   |  25  |  2.00<H>    Ca    8.4<L>      01 Jul 2017 04:21  Mg     1.8     07-01    TPro  7.2  /  Alb  3.0<L>  /  TBili  0.4  /  DBili  x   /  AST  16  /  ALT  18  /  AlkPhos  116  07-01     LIVER FUNCTIONS - ( 01 Jul 2017 04:21 )  Alb: 3.0 g/dL / Pro: 7.2 gm/dL / ALK PHOS: 116 U/L / ALT: 18 U/L / AST: 16 U/L / GGT: x                   Radiology: < from: CT Chest No Cont (06.30.17 @ 22:26) >  EXAM:  CT CHEST                            PROCEDURE DATE:  06/30/2017        INTERPRETATION:  .    CLINICAL INFORMATION: Shortness of breath. History of metastatic disease.    TECHNIQUE: Helical axial images of the chest were obtained from the   thoracic inlet to the adrenal glands without the administration of   intravenous contrast. Sagittal and coronal reformations were obtained   from the source data.     COMPARISON: Comparison made to a prior CT angiogram examination of the   chest dated 10/9/2016.     FINDINGS: There is no axillary adenopathy. Multiple subcentimeter sized   lymph nodes are notable within the mediastinum which are nonspecific.   There is no hilar lymphadenopathy.    The heart size is normal. The pericardium appears unremarkable. The   ventricular chambers appear slightly hypodense when compared to the   interventricular septum, for which anemia can be considered.  There are   atherosclerotic calcifications of the imaged coronary arteries, aorta,   and branch vessels. The imaged portions of the aorta are normal in   caliber. There is a Mediport overlying the right chest wall, with its tip   in the SVC.    The central airways are patent. Scattered rounded pulmonary nodules are   notable throughout both lungs, compatible with metastatic disease. A   representative nodule measures 7 mm within the left upper lobe. There is   a focal rounded patchy ground glass opacity within the superior segment   of the right lower lobe. A few patchy opacities are also notablewithin   the left lung base. These opacities and nodules are new when compared to   the prior CT examination.    The patient is status post partial left hepatectomy. There is a vague   rounded hypodense lesion within the left medial hepatic lobe which   measures up to 5.3 cm suspicious for metastatic disease. The patient is   status post infrarenal IVC filter placement. An exophytic right-sided   renal cyst is noted.    The patient is status post upper ventral abdominal hernia repair.    Multilevel degenerative changes are noted within the imaged potions of   the spine.    IMPRESSION:    1. Metastatic disease to the lungs.    2. Patchy groundglass opacities within the bilateral lower lobes for   which pneumonia can be considered. Correlate clinically.    3. Suspicion of a metastatic lesion to the liver.    < end of copied text >  < from: CT Head No Cont (06.30.17 @ 22:22) >    EXAM:  CT BRAIN                            PROCEDURE DATE:  06/30/2017        INTERPRETATION:  .    CLINICAL INFORMATION: History of metastatic lung cancer. Assess for   metastatic disease.    TECHNIQUE: Multiple axial CT images of the head were obtained without   contrast. Sagittal and coronal reconstructed images were acquired from   the source data.    COMPARISON: No prior CT studies of the brain are available for comparison   at this institution.    FINDINGS: There is no acute intracranial hemorrhage, mass effect, midline   shift, herniation, extra-axial fluid collection, or hydrocephalus.    There is diffuse cerebral volume loss with prominence of the sulci,   fissures, and cisternal spaces which is normal for the patient's age.   There is mild periventricular white matter hypoattenuation statistically   compatible with microvascular changes given calcific atherosclerotic   disease of the intracranial arteries.    The paranasal sinuses and mastoid air cells are clear. The calvariumis   intact. The imaged orbits are unremarkable.    IMPRESSION: No acute intracranial hemorrhage, mass effect, or midline   shift.    Evaluation for intracranial metastatic disease is limited on noncontrast   CT modality.    Consider further evaluation with a contrast-enhanced MRI examination of   the brain, if there are no clinical complications.    < end of copied text >      Advanced directives addressed: full resuscitation
CHIEF COMPLAINT: SOB and generalized weakness    HPI:  72 y/o M PMHx significant for metastatic colon cancer receiving chemotherapy with recent progression of disease, paroxysmal atrial fibrillation, PE/DVT, HTN, HLD admitted 6/30/17 with complaints of dyspnea.  He describes the rather acute onset of shortness of breath and generalized weakness on the day of admission while packing for a trip to [place] where he hopes to receive additional oncologic care.  He was unable to identify exacerbating or alleviating factors.  In the ED he was found to be hypoxic with SaO2 was 86-88% (improved with supplemental oxygen), anemic (Hgb 8.5), and renal insufficient (Cr 2.53).    PAST MEDICAL & SURGICAL HISTORY:  Other chronic pulmonary embolism  Colon cancer  HTN (hypertension)  Hypercholesterolemia  Glaucoma of both eyes, unspecified glaucoma  S/P IVC filter  History of colon resection    SOCIAL HISTORY:   Alcohol: Denied  Smoking: Nonsmoker  Marital Status:     FAMILY HISTORY: FAMILY HISTORY:  No pertinent family history in first degree relatives    MEDICATIONS  (STANDING):  finasteride 5 milliGRAM(s) Oral daily  tamsulosin 0.4 milliGRAM(s) Oral at bedtime  simvastatin 20 milliGRAM(s) Oral at bedtime  metoprolol succinate ER 25 milliGRAM(s) Oral daily  amLODIPine   Tablet 10 milliGRAM(s) Oral daily  pantoprazole    Tablet 40 milliGRAM(s) Oral before breakfast  cefepime  IVPB 2000 milliGRAM(s) IV Intermittent every 24 hours  cefepime  IVPB   IV Intermittent   docusate sodium 200 milliGRAM(s) Oral daily  apixaban 5 milliGRAM(s) Oral two times a day  lactobacillus acidophilus 2 Tablet(s) Oral two times a day with meals  latanoprost 0.005% Ophthalmic Solution 1 Drop(s) Both EYES at bedtime    MEDICATIONS  (PRN):  acetaminophen   Tablet 650 milliGRAM(s) Oral every 6 hours PRN For Temp greater than 38 C (100.4 F)  acetaminophen   Tablet. 650 milliGRAM(s) Oral every 6 hours PRN Mild Pain (1 - 3)  ondansetron Injectable 4 milliGRAM(s) IV Push every 6 hours PRN Nausea  senna 2 Tablet(s) Oral at bedtime PRN Constipation  zaleplon 5 milliGRAM(s) Oral at bedtime PRN Insomnia    ALLERGIES: penicillin     REVIEW OF SYSTEMS:  CONSTITUTIONAL: + weakness, no fevers or chills  EYES/ENT: No visual changes;  No vertigo or throat pain   NECK: No pain or stiffness  RESPIRATORY: No cough, wheezing, hemoptysis; + shortness of breath  CARDIOVASCULAR: No chest pain or palpitations  GASTROINTESTINAL: No abdominal pain. No nausea, vomiting, or hematemesis; No diarrhea or constipation. No melena or hematochezia.  GENITOURINARY: No dysuria, frequency or hematuria  NEUROLOGICAL: No numbness or weakness  SKIN: No itching or rash  All other review of systems is negative unless indicated above    VITAL SIGNS:   Vital Signs Last 24 Hrs  T(C): 36.8 (02 Jul 2017 05:25), Max: 37.9 (01 Jul 2017 10:58)  T(F): 98.2 (02 Jul 2017 05:25), Max: 100.2 (01 Jul 2017 10:58)  HR: 80 (02 Jul 2017 05:25) (72 - 80)  BP: 143/80 (02 Jul 2017 05:25) (125/65 - 143/80)  RR: 17 (02 Jul 2017 05:25) (16 - 18)  SpO2: 99% (02 Jul 2017 05:25) (96% - 99%)    PHYSICAL EXAM:  Constitutional: NAD, awake and alert, well-developed  Eyes:  EOMI,  Pupils round, No oral cyanosis.  Pulmonary: Non-labored, breath sounds with scattered rhonchi and wheeze  Cardiovascular: S1 and S2, regular rate and rhythm, no Murmurs, gallops or rubs  Gastrointestinal: Bowel Sounds present, soft, nontender.   Lymph: No pedal edema. No cervical lymphadenopathy.  Neurological: Alert, no focal deficits  Skin: No rash.  Psych:  Mood & affect appropriate    LABS:                     8.0    10.9  )-----------( 120      ( 02 Jul 2017 06:07 )             23.8              139    |  102    |  29     ----------------------------<  104    4.2     |  27     |  2.38      PTT - ( 01 Jul 2017 11:29 )  PTT:74.4 sec    CARDIAC MARKERS ( 30 Jun 2017 13:46 ) 0.106 ng/mL      CARDIAC MARKERS ( 30 Jun 2017 10:46 ) 0.097 ng/mL       06-30 @ 10:46, Pro Bnp 370    CT Chest:  1. Metastatic disease to the lungs.  2. Patchy groundglass opacities within the bilateral lower lobes for which pneumonia can be considered. Correlate clinically.  3. Suspicion of a metastatic lesion to the liver.    ECG (6/30 @ 10:48): Normal sinus rhythm 75 bpm, incomplete RBBB
Patient is a 71y Male whom presented to the hospital with a history of Lung Ca, HTN, HLD, arrythmia, CKD with baseline renal function near 2.0 mg/dl (chemo induced), BPH with renal evaluation of ELIANA on CKD. Patient here with increasing SOB, noted with PNA and started on ABX. No NSAID use, baseline at oncologist they report is 1.9-2.1 mg/dl. Good oral intake, cough low sat is what prompted ER visit. Feels better today, taking po. History of microscopic hematuria, bph and is known to have PVR and has suggested surgery in the past as per wife.     PAST MEDICAL & SURGICAL HISTORY:  Other chronic pulmonary embolism  Colon cancer  HTN (hypertension)  Hypercholesterolemia  Glaucoma of both eyes, unspecified glaucoma  S/P IVC filter  History of colon resection      MEDICATIONS  (STANDING):  finasteride 5 milliGRAM(s) Oral daily  tamsulosin 0.4 milliGRAM(s) Oral at bedtime  amiodarone    Tablet 200 milliGRAM(s) Oral daily  simvastatin 20 milliGRAM(s) Oral at bedtime  metoprolol succinate ER 25 milliGRAM(s) Oral daily  amLODIPine   Tablet 10 milliGRAM(s) Oral daily  pantoprazole    Tablet 40 milliGRAM(s) Oral before breakfast  heparin  Infusion.  Unit(s)/Hr (16 mL/Hr) IV Continuous <Continuous>  cefepime  IVPB 2000 milliGRAM(s) IV Intermittent every 24 hours  cefepime  IVPB   IV Intermittent   sodium chloride 0.9%. 1000 milliLiter(s) (60 mL/Hr) IV Continuous <Continuous>    MEDICATIONS  (PRN):  acetaminophen   Tablet 650 milliGRAM(s) Oral every 6 hours PRN For Temp greater than 38 C (100.4 F)  acetaminophen   Tablet. 650 milliGRAM(s) Oral every 6 hours PRN Mild Pain (1 - 3)  ondansetron Injectable 4 milliGRAM(s) IV Push every 6 hours PRN Nausea  senna 2 Tablet(s) Oral at bedtime PRN Constipation  docusate sodium 100 milliGRAM(s) Oral three times a day PRN Constipation  heparin  Injectable 7000 Unit(s) IV Push every 6 hours PRN For aPTT less than 40  heparin  Injectable 3500 Unit(s) IV Push every 6 hours PRN For aPTT between 40 - 57  zaleplon 5 milliGRAM(s) Oral at bedtime PRN Insomnia      Allergies    penicillin (Unknown)  penicillins (Rash)    Intolerances        SOCIAL HISTORY:  no current cigg/etoh    FAMILY HISTORY:  No pertinent family history in first degree relatives      REVIEW OF SYSTEMS:    CONSTITUTIONAL: No weakness, fevers or chills  EYES/ENT: No visual changes;  No vertigo or throat pain   NECK: No pain or stiffness  RESPIRATORY:+ cough, wheezing, hemoptysis; stable shortness of breath  CARDIOVASCULAR: No chest pain or palpitations  GASTROINTESTINAL: No abdominal or epigastric pain. No nausea, vomiting, or hematemesis; No diarrhea or constipation. No melena or hematochezia.  GENITOURINARY: No dysuria, frequency or hematuria  NEUROLOGICAL: No numbness or weakness  SKIN: No itching, burning, rashes, or lesions   All other review of systems is negative unless indicated above.      T(C): , Max: 38.3 (07-01-17 @ 07:55)  T(F): , Max: 101 (07-01-17 @ 07:55)  HR: 72 (07-01-17 @ 10:58)  BP: 129/64 (07-01-17 @ 10:58)  BP(mean): 100 (06-30-17 @ 22:41)  RR: 18 (07-01-17 @ 10:58)  SpO2: 96% (07-01-17 @ 10:58)  Wt(kg): --    Height (cm): 172.72 (06-30 @ 22:54)  Weight (kg): 87.7 (06-30 @ 15:01)  BMI (kg/m2): 29.4 (06-30 @ 22:54)  BSA (m2): 2.01 (06-30 @ 22:54)    PHYSICAL EXAM:    Constitutional: NAD  HEENT: PERRLA, EOMI,  MMM  Neck: No LAD, No JVD  Respiratory: CTAB  Cardiovascular: S1 and S2, RRR  Gastrointestinal: BS+, soft, NT/ND  Extremities: No peripheral edema  Neurological: A/O x 3, no focal deficits  Psychiatric: Normal mood, normal affect  : No Lau  Skin: No rashes  Access: R sided port        LABS:                        7.4    9.4   )-----------( 123      ( 01 Jul 2017 04:21 )             22.1     01 Jul 2017 04:21    138    |  104    |  25     ----------------------------<  118    4.1     |  25     |  2.00   30 Jun 2017 10:46    136    |  104    |  31     ----------------------------<  162    4.5     |  26     |  2.53     Ca    8.4        01 Jul 2017 04:21  Ca    8.7        30 Jun 2017 10:46  Mg     1.8       01 Jul 2017 04:21    TPro  7.2    /  Alb  3.0    /  TBili  0.4    /  DBili  x      /  AST  16     /  ALT  18     /  AlkPhos  116    01 Jul 2017 04:21  TPro  8.4    /  Alb  3.5    /  TBili  0.3    /  DBili  x      /  AST  18     /  ALT  20     /  AlkPhos  130    30 Jun 2017 10:46          Urine Studies:          RADIOLOGY & ADDITIONAL STUDIES:
Patient is a 71y old  Male who presents with a chief complaint of Shortness of Breath (30 Jun 2017 19:30)      HPI:  70 y/o M PMHx significant for metastatic colon cancer (lung, liver) receiving chemotherapy ([facility] trial), hx of AFib/AFlutter, recent PE, DVT (on DOAC), HTN, HLD, and glaucoma admitted 6/30 w/ c/o progressively worsening intermittent episodes of shortness of breath over the last several weeks w/ abrupt worsening this morning. In ER, patient was hypoxic  to 86-88% which improved on NRB and then nasal cannula. P recently chemo last week. Reports occasional cough, denies any recent sick contacts, fevers, myalgias/arthralgias. Here, afebrile, normal wbc ct, CT chest with bilateral groundglass opacities in lower lobes ? PNA, was given IV vancomycin/cefepime d/t concern for infection.      PMH: as above    PSH: as above    Meds: per reconciliation sheet, noted below    MEDICATIONS  (STANDING):  finasteride 5 milliGRAM(s) Oral daily  tamsulosin 0.4 milliGRAM(s) Oral at bedtime  amiodarone    Tablet 200 milliGRAM(s) Oral daily  simvastatin 20 milliGRAM(s) Oral at bedtime  metoprolol succinate ER 25 milliGRAM(s) Oral daily  amLODIPine   Tablet 10 milliGRAM(s) Oral daily  pantoprazole    Tablet 40 milliGRAM(s) Oral before breakfast  heparin  Infusion.  Unit(s)/Hr (16 mL/Hr) IV Continuous <Continuous>  cefepime  IVPB 2000 milliGRAM(s) IV Intermittent every 24 hours  cefepime  IVPB   IV Intermittent   sodium chloride 0.9%. 1000 milliLiter(s) (60 mL/Hr) IV Continuous <Continuous>      Allergies    penicillin (Unknown)  penicillins (Rash)    Intolerances        Social: no smoking, no alcohol, no illegal drugs; no recent travel, no exposure to TB    Family history:  No pertinent family history in first degree relatives      ROS: the patient denies fever, no chills, no HA, no dizziness, no sore throat, no blurry vision, no CP, no palpitations, no abdominal pain, no diarrhea, no N/V, no dysuria, no leg pain, no claudication, no rash, no joint aches, no rectal pain or bleeding, no night sweats    Vital Signs Last 24 Hrs  T(C): 37.9 (01 Jul 2017 10:58), Max: 38.3 (01 Jul 2017 07:55)  T(F): 100.2 (01 Jul 2017 10:58), Max: 101 (01 Jul 2017 07:55)  HR: 72 (01 Jul 2017 10:58) (72 - 84)  BP: 129/64 (01 Jul 2017 10:58) (129/64 - 165/79)  BP(mean): 100 (30 Jun 2017 22:41) (100 - 100)  RR: 18 (01 Jul 2017 10:58) (16 - 27)  SpO2: 96% (01 Jul 2017 10:58) (94% - 100%)      PE:  Constitutional: frail looking  HEENT: NC/AT, EOMI, PERRLA  Neck: supple  Back: no tenderness  Respiratory: decreased breath sounds  Cardiovascular: S1S2 regular, no murmurs  Abdomen: soft, not tender, not distended, positive BS  Genitourinary: deferred  Rectal: deferred  Musculoskeletal: no muscle tenderness, no joint swelling or tenderness  Extremities: no pedal edema  Neurological:  no focal deficits  Skin: no rashes    Labs:                        7.4    9.4   )-----------( 123      ( 01 Jul 2017 04:21 )             22.1     07-01    138  |  104  |  25<H>  ----------------------------<  118<H>  4.1   |  25  |  2.00<H>    Ca    8.4<L>      01 Jul 2017 04:21  Mg     1.8     07-01    TPro  7.2  /  Alb  3.0<L>  /  TBili  0.4  /  DBili  x   /  AST  16  /  ALT  18  /  AlkPhos  116  07-01     LIVER FUNCTIONS - ( 01 Jul 2017 04:21 )  Alb: 3.0 g/dL / Pro: 7.2 gm/dL / ALK PHOS: 116 U/L / ALT: 18 U/L / AST: 16 U/L / GGT: x                   Radiology: < from: CT Chest No Cont (06.30.17 @ 22:26) >  EXAM:  CT CHEST                            PROCEDURE DATE:  06/30/2017        INTERPRETATION:  .    CLINICAL INFORMATION: Shortness of breath. History of metastatic disease.    TECHNIQUE: Helical axial images of the chest were obtained from the   thoracic inlet to the adrenal glands without the administration of   intravenous contrast. Sagittal and coronal reformations were obtained   from the source data.     COMPARISON: Comparison made to a prior CT angiogram examination of the   chest dated 10/9/2016.     FINDINGS: There is no axillary adenopathy. Multiple subcentimeter sized   lymph nodes are notable within the mediastinum which are nonspecific.   There is no hilar lymphadenopathy.    The heart size is normal. The pericardium appears unremarkable. The   ventricular chambers appear slightly hypodense when compared to the   interventricular septum, for which anemia can be considered.  There are   atherosclerotic calcifications of the imaged coronary arteries, aorta,   and branch vessels. The imaged portions of the aorta are normal in   caliber. There is a Mediport overlying the right chest wall, with its tip   in the SVC.    The central airways are patent. Scattered rounded pulmonary nodules are   notable throughout both lungs, compatible with metastatic disease. A   representative nodule measures 7 mm within the left upper lobe. There is   a focal rounded patchy ground glass opacity within the superior segment   of the right lower lobe. A few patchy opacities are also notablewithin   the left lung base. These opacities and nodules are new when compared to   the prior CT examination.    The patient is status post partial left hepatectomy. There is a vague   rounded hypodense lesion within the left medial hepatic lobe which   measures up to 5.3 cm suspicious for metastatic disease. The patient is   status post infrarenal IVC filter placement. An exophytic right-sided   renal cyst is noted.    The patient is status post upper ventral abdominal hernia repair.    Multilevel degenerative changes are noted within the imaged potions of   the spine.    IMPRESSION:    1. Metastatic disease to the lungs.    2. Patchy groundglass opacities within the bilateral lower lobes for   which pneumonia can be considered. Correlate clinically.    3. Suspicion of a metastatic lesion to the liver.    < end of copied text >  < from: CT Head No Cont (06.30.17 @ 22:22) >    EXAM:  CT BRAIN                            PROCEDURE DATE:  06/30/2017        INTERPRETATION:  .    CLINICAL INFORMATION: History of metastatic lung cancer. Assess for   metastatic disease.    TECHNIQUE: Multiple axial CT images of the head were obtained without   contrast. Sagittal and coronal reconstructed images were acquired from   the source data.    COMPARISON: No prior CT studies of the brain are available for comparison   at this institution.    FINDINGS: There is no acute intracranial hemorrhage, mass effect, midline   shift, herniation, extra-axial fluid collection, or hydrocephalus.    There is diffuse cerebral volume loss with prominence of the sulci,   fissures, and cisternal spaces which is normal for the patient's age.   There is mild periventricular white matter hypoattenuation statistically   compatible with microvascular changes given calcific atherosclerotic   disease of the intracranial arteries.    The paranasal sinuses and mastoid air cells are clear. The calvariumis   intact. The imaged orbits are unremarkable.    IMPRESSION: No acute intracranial hemorrhage, mass effect, or midline   shift.    Evaluation for intracranial metastatic disease is limited on noncontrast   CT modality.    Consider further evaluation with a contrast-enhanced MRI examination of   the brain, if there are no clinical complications.    < end of copied text >      Advanced directives addressed: full resuscitation

## 2017-07-02 NOTE — CONSULT NOTE ADULT - PROBLEM SELECTOR RECOMMENDATION 9
SOB has improved; likely multifactorial but I do not suspect a significant cardiac etiology.  I reviewed his TTE (7/1): No pericardial effusion, grossly normal LV and RV function.
Patient and wife and patient's daughter inquiring about another trial - I will look into this but it may not be resolved over the weekend due to not having access to all records until patient's oncologist's office is open during the week (unclear mutational status etc) -regardless no urgency to find trial while patient is being tx for infection - appreciate management per ID/Medicine

## 2017-07-02 NOTE — PROGRESS NOTE ADULT - SUBJECTIVE AND OBJECTIVE BOX
70 y/o M PMHx significant for metastatic colon cancer (lung, liver) receiving chemotherapy ([facility] trial), hx of AFib/AFlutter, recent PE, DVT (on DOAC), HTN, HLD, and glaucoma who presents to the ED w/ c/o progressively worsening intermittent episodes of shortness of breath over the last several weeks w/ abrupt worsening this morning. In triage the patient was hypoxic SaO2 was 86-88% which improved on NRB now on NC via 2L/min. Labs were notable for Hbg/Hct 8.5/24.6, INR 1.68, BUN/Cr 31/2.53, (was 20/1.73), TnI 0.097 -> 0.106. Of note the patient denies any symptoms of chest pain, or palpitations and recently completed his last course 1 week ago at [facility]. The patient denies any recent sick contacts, denies cough, fevers, myalgias/arthralgias.       7/1: No O/N events. Low-grade fever this AM, but feels much better overall. Discussed timing of DVT recurrence @ length, as well as concerns re: pulm toxicity d/t amiodarone. Remains on supplemental O2.     7/2: No O/N events. Feels significantly better. Off supplemental O2. Afebrile.   	    Physical Exam:  · Constitutional	detailed exam	  · Constitutional Details	respiratory distress	  · Respiratory Distress	mild	  · Eyes	detailed exam	  · Eyes Details	PERRL	  · ENMT	not examined	  · Neck	detailed exam	  · Neck Details	supple; no JVD	  · Breasts	not examined	  · Back	not examined	  · Respiratory	detailed exam	  · Respiratory Details	good air movement; clear to auscultation bilaterally	  · Cardiovascular	detailed exam	  · Cardiovascular Details	regular rate and rhythm	  · Cardiovascular Details	positive S1; positive S2	  · Gastrointestinal	detailed exam	  · GI Normal	soft; nontender; no distention	  · Genitourinary	patient refused	  · Rectal	patient refused	  · Extremities	detailed exam	  · Extremities Details	no clubbing; no cyanosis	  · Vascular	Equal and normal pulses (carotid, femoral, dorsalis pedis)	  · Neurological	Alert & oriented; no sensory, motor or coordination deficits, normal reflexes	  · Skin	No lesions; no rash	  · Lymph Nodes	No lymphadedenopathy	  · Musculoskeletal	No joint pain, swelling or deformity; no limitation of movement	      Laboratory:   T(C): 36.8 (07-02-17 @ 05:25), Max: 37.2 (07-01-17 @ 19:30) HR: 69 (07-02-17 @ 11:17) (69 - 80) BP: 115/65 (07-02-17 @ 11:17) (115/65 - 143/80) RR: 18 (07-02-17 @ 11:17) (16 - 18) SpO2: 98% (07-02-17 @ 11:17) (98% - 99%) Wt(kg): --              8.0   10.9  )-----------( 120      ( 02 Jul 2017 06:07 )            23.8   02 Jul 2017 06:07  139    |  102    |  29    ----------------------------<  104   4.2     |  27     |  2.38   Ca    8.6        02 Jul 2017 06:07 Phos  3.1       02 Jul 2017 06:07 Mg     2.0       02 Jul 2017 06:07  TPro  7.6    /  Alb  2.9    /  TBili  0.5    /  DBili  x      /  AST  17     /  ALT  14     /  AlkPhos  104    02 Jul 2017 06:07  PT/INR - ( 01 Jul 2017 04:21 )   PT: 17.0 sec;   INR: 1.56 ratio      PTT - ( 01 Jul 2017 11:29 )  PTT:74.4 sec CAPILLARY BLOOD GLUCOSE    LIVER FUNCTIONS - ( 02 Jul 2017 06:07 ) Alb: 2.9 g/dL / Pro: 7.6 gm/dL / ALK PHOS: 104 U/L / ALT: 14 U/L / AST: 17 U/L / GGT: x             	  	  	  	  	  	  	  	  	  	  	  	  	  	  	  	  	  	  	  	  	  	  	  	  	  	  	  	  	  	  	  	  	  	  	  	  	  	  	  	  	  	  	  	  	  	  	  	  	  	  	  	  	  	  	  	  	  	  	    Assessment and Plan:   Assessment:  · Assessment		  70 y/o M PMHx significant for metastatic colon cancer (lung, liver) receiving chemotherapy ([facility] trial), hx of AFib/AFlutter, recent PE, DVT (on DOAC therapy), HTN, HLD, and glaucoma who presents to the ED w/ c/o progressively worsening intermittent episodes of shortness of breath over the last several weeks w/ abrupt worsening this morning. In triage the patient was hypoxic SaO2 was 86-88% which improved on NRB now on NC via 2L/min. Labs were notable for Hbg/Hct 8.5/24.6, INR 1.68, BUN/Cr 31/2.53, (was 20/1.73), TnI 0.097 -> 0.106. Of note the patient denies any symptoms of chest pain, or palpitations and recently completed his last course 1 week ago at [facility]. The patient denies any recent sick contacts, denies cough, fevers, myalgias/arthralgias.       ·  Hypoxemic respiratory primarily 2/2 HCAP, likely Gram (-) or other resistant organisms, a/w advanced pulmonary metastasis with colon primary; recurrent DVT occurred while OFF of NOAC & not considered a therapeutic failure; ? PE but Pt intolerant to V/Q scan, unable to use CTA- moot point though, will require lifelong AC  - c/w IV abx day #3  - c/w Eliquis  - f/u Cx's   - D/C amiodarone d/t family concerns of pulmonary toxicity in light of metastatic disease    ·  Elevated troponin, demand ischemia with CKD     ·  ELIANA on CKD stage III- resolved   - avoid nephrotoxins  - PVR    ·  Acute on chronic anemia 2/2 malignancy, CKD, recent chemo- improved  - monitor clinically       ·  HTN (hypertension).  Plan: ~cont. Amlodipine 10mg po daily.       ·	Afib, currently NSR  - D/C amiodarone, anticipate 6-8 week washout period  - c/w BB--> may need uptitration @ some point    ·   Glaucoma of both eyes, unspecified glaucoma.  Plan: ~and Lumigan and Latanoprost.       * DVT PPx- Eliquis

## 2017-07-03 VITALS
OXYGEN SATURATION: 97 % | HEART RATE: 65 BPM | SYSTOLIC BLOOD PRESSURE: 99 MMHG | RESPIRATION RATE: 18 BRPM | DIASTOLIC BLOOD PRESSURE: 64 MMHG | TEMPERATURE: 97 F

## 2017-07-03 LAB
ANION GAP SERPL CALC-SCNC: 8 MMOL/L — SIGNIFICANT CHANGE UP (ref 5–17)
BUN SERPL-MCNC: 34 MG/DL — HIGH (ref 7–23)
CALCIUM SERPL-MCNC: 8.5 MG/DL — SIGNIFICANT CHANGE UP (ref 8.5–10.1)
CHLORIDE SERPL-SCNC: 105 MMOL/L — SIGNIFICANT CHANGE UP (ref 96–108)
CO2 SERPL-SCNC: 27 MMOL/L — SIGNIFICANT CHANGE UP (ref 22–31)
CREAT SERPL-MCNC: 2.34 MG/DL — HIGH (ref 0.5–1.3)
GLUCOSE SERPL-MCNC: 115 MG/DL — HIGH (ref 70–99)
HCT VFR BLD CALC: 23.9 % — LOW (ref 39–50)
HGB BLD-MCNC: 7.8 G/DL — LOW (ref 13–17)
MAGNESIUM SERPL-MCNC: 2.2 MG/DL — SIGNIFICANT CHANGE UP (ref 1.6–2.6)
MCHC RBC-ENTMCNC: 30.9 PG — SIGNIFICANT CHANGE UP (ref 27–34)
MCHC RBC-ENTMCNC: 32.7 GM/DL — SIGNIFICANT CHANGE UP (ref 32–36)
MCV RBC AUTO: 94.4 FL — SIGNIFICANT CHANGE UP (ref 80–100)
PHOSPHATE SERPL-MCNC: 3.6 MG/DL — SIGNIFICANT CHANGE UP (ref 2.5–4.5)
PLATELET # BLD AUTO: 158 K/UL — SIGNIFICANT CHANGE UP (ref 150–400)
POTASSIUM SERPL-MCNC: 4 MMOL/L — SIGNIFICANT CHANGE UP (ref 3.5–5.3)
POTASSIUM SERPL-SCNC: 4 MMOL/L — SIGNIFICANT CHANGE UP (ref 3.5–5.3)
RBC # BLD: 2.53 M/UL — LOW (ref 4.2–5.8)
RBC # FLD: 13.8 % — SIGNIFICANT CHANGE UP (ref 10.3–14.5)
SODIUM SERPL-SCNC: 140 MMOL/L — SIGNIFICANT CHANGE UP (ref 135–145)
WBC # BLD: 9.4 K/UL — SIGNIFICANT CHANGE UP (ref 3.8–10.5)
WBC # FLD AUTO: 9.4 K/UL — SIGNIFICANT CHANGE UP (ref 3.8–10.5)

## 2017-07-03 PROCEDURE — 99233 SBSQ HOSP IP/OBS HIGH 50: CPT

## 2017-07-03 RX ORDER — BIMATOPROST 0.3 MG/ML
0 SOLUTION/ DROPS OPHTHALMIC
Qty: 0 | Refills: 0 | COMMUNITY

## 2017-07-03 RX ORDER — APIXABAN 2.5 MG/1
1 TABLET, FILM COATED ORAL
Qty: 0 | Refills: 0 | COMMUNITY

## 2017-07-03 RX ORDER — LATANOPROST 0.05 MG/ML
1 SOLUTION/ DROPS OPHTHALMIC; TOPICAL
Qty: 0 | Refills: 0 | COMMUNITY

## 2017-07-03 RX ORDER — AMIODARONE HYDROCHLORIDE 400 MG/1
1 TABLET ORAL
Qty: 0 | Refills: 0 | COMMUNITY

## 2017-07-03 RX ORDER — APIXABAN 2.5 MG/1
1 TABLET, FILM COATED ORAL
Qty: 60 | Refills: 5 | OUTPATIENT
Start: 2017-07-03 | End: 2017-12-29

## 2017-07-03 RX ORDER — CEFUROXIME AXETIL 250 MG
1 TABLET ORAL
Qty: 14 | Refills: 0 | OUTPATIENT
Start: 2017-07-03 | End: 2017-07-10

## 2017-07-03 RX ORDER — L.ACIDOPH/B.ANIMALIS/B.LONGUM 15B CELL
1 CAPSULE ORAL
Qty: 60 | Refills: 11 | OUTPATIENT
Start: 2017-07-03 | End: 2018-06-27

## 2017-07-03 RX ORDER — ZALEPLON 10 MG
1 CAPSULE ORAL
Qty: 0 | Refills: 0 | COMMUNITY
Start: 2017-07-03

## 2017-07-03 RX ADMIN — AMLODIPINE BESYLATE 10 MILLIGRAM(S): 2.5 TABLET ORAL at 06:10

## 2017-07-03 RX ADMIN — Medication 2 TABLET(S): at 08:24

## 2017-07-03 RX ADMIN — APIXABAN 5 MILLIGRAM(S): 2.5 TABLET, FILM COATED ORAL at 06:10

## 2017-07-03 RX ADMIN — Medication 200 MILLIGRAM(S): at 11:40

## 2017-07-03 RX ADMIN — FINASTERIDE 5 MILLIGRAM(S): 5 TABLET, FILM COATED ORAL at 11:39

## 2017-07-03 RX ADMIN — Medication 25 MILLIGRAM(S): at 06:10

## 2017-07-03 RX ADMIN — PANTOPRAZOLE SODIUM 40 MILLIGRAM(S): 20 TABLET, DELAYED RELEASE ORAL at 06:10

## 2017-07-03 NOTE — PROGRESS NOTE ADULT - SUBJECTIVE AND OBJECTIVE BOX
· Provider Specialty	Cardiology	    Referral/Consultation:   Initial Consult:  · Requested by Name:	Dr. Quesada / Dr. Fischer	  · Date/Time:	02-Jul-2017 09:13	  · Reason for Referral/Consultation:	Asked to see patient for SOB	      · Subjective and Objective: 	    CHIEF COMPLAINT: SOB and generalized weakness    HPI:  70 y/o M PMHx significant for metastatic colon cancer receiving chemotherapy with recent progression of disease, paroxysmal atrial fibrillation, PE/DVT, HTN, HLD admitted 6/30/17 with complaints of dyspnea.  He describes the rather acute onset of shortness of breath and generalized weakness on the day of admission while packing for a trip to [place] where he hopes to receive additional oncologic care.  He was unable to identify exacerbating or alleviating factors.  In the ED he was found to be hypoxic with SaO2 was 86-88% (improved with supplemental oxygen), anemic (Hgb 8.5), and renal insufficient (Cr 2.53).    07/03/17 - No chest pain, palpitations, lightheadedness. + occasional cough and wheeze. presently breathing comfortably.       PAST MEDICAL & SURGICAL HISTORY:  Other chronic pulmonary embolism  Colon cancer  HTN (hypertension)  Hypercholesterolemia  Glaucoma of both eyes, unspecified glaucoma  S/P IVC filter  History of colon resection      Allergies    penicillin (Unknown)  penicillins (Rash)      SOCIAL HISTORY: Nonsmoker;  No ETOH.     FAMILY HISTORY:  No pertinent family history in first degree relatives      MEDICATIONS:    MEDICATIONS  (STANDING):  finasteride 5 milliGRAM(s) Oral daily  tamsulosin 0.4 milliGRAM(s) Oral at bedtime  simvastatin 20 milliGRAM(s) Oral at bedtime  metoprolol succinate ER 25 milliGRAM(s) Oral daily  amLODIPine   Tablet 10 milliGRAM(s) Oral daily  pantoprazole    Tablet 40 milliGRAM(s) Oral before breakfast  cefepime  IVPB 2000 milliGRAM(s) IV Intermittent every 24 hours  cefepime  IVPB   IV Intermittent   docusate sodium 200 milliGRAM(s) Oral daily  apixaban 5 milliGRAM(s) Oral two times a day  lactobacillus acidophilus 2 Tablet(s) Oral two times a day with meals  latanoprost 0.005% Ophthalmic Solution 1 Drop(s) Both EYES at bedtime    MEDICATIONS  (PRN):  acetaminophen   Tablet 650 milliGRAM(s) Oral every 6 hours PRN For Temp greater than 38 C (100.4 F)  acetaminophen   Tablet. 650 milliGRAM(s) Oral every 6 hours PRN Mild Pain (1 - 3)  ondansetron Injectable 4 milliGRAM(s) IV Push every 6 hours PRN Nausea  senna 2 Tablet(s) Oral at bedtime PRN Constipation  zaleplon 5 milliGRAM(s) Oral at bedtime PRN Insomnia      REVIEW OF SYSTEMS:    As above.      Vital Signs Last 24 Hrs  T(C): 36.6 (03 Jul 2017 05:55), Max: 36.7 (02 Jul 2017 17:28)  T(F): 97.9 (03 Jul 2017 05:55), Max: 98 (02 Jul 2017 17:28)  HR: 64 (03 Jul 2017 05:55) (64 - 69)  BP: 117/63 (03 Jul 2017 05:55) (115/65 - 128/72)  BP(mean): --  RR: 18 (02 Jul 2017 11:17) (18 - 18)  SpO2: 97% (03 Jul 2017 05:55) (97% - 98%)    I&O's Summary    02 Jul 2017 07:01  -  03 Jul 2017 07:00  --------------------------------------------------------  IN: 0 mL / OUT: 600 mL / NET: -600 mL        PHYSICAL EXAM:    Constitutional: NAD, awake and alert, well-developed  HEENT: NC.AT. No oral cyanosis.   Respiratory: Scattered rhonchi and expiratory wheeze.   Cardiovascular: S1 and S2, regular rhythm.   Gastrointestinal: Bowel Sounds present, soft, nontender.   Extremities: No peripheral edema. No clubbing or cyanosis.  Neurological: A/O x3  Musculoskeletal: no calf tenderness.  Skin: No rashes.      LABS: All Labs Reviewed:                        7.8    9.4   )-----------( 158      ( 03 Jul 2017 05:23 )             23.9                         8.0    10.9  )-----------( 120      ( 02 Jul 2017 06:07 )             23.8                         7.4    9.4   )-----------( 123      ( 01 Jul 2017 04:21 )             22.1     03 Jul 2017 05:23    140    |  105    |  34     ----------------------------<  115    4.0     |  27     |  2.34   02 Jul 2017 06:07    139    |  102    |  29     ----------------------------<  104    4.2     |  27     |  2.38   01 Jul 2017 04:21    138    |  104    |  25     ----------------------------<  118    4.1     |  25     |  2.00     Ca    8.5        03 Jul 2017 05:23  Ca    8.6        02 Jul 2017 06:07  Ca    8.4        01 Jul 2017 04:21  Phos  3.6       03 Jul 2017 05:23  Phos  3.1       02 Jul 2017 06:07  Mg     2.2       03 Jul 2017 05:23  Mg     2.0       02 Jul 2017 06:07  Mg     1.8       01 Jul 2017 04:21    TPro  7.6    /  Alb  2.9    /  TBili  0.5    /  DBili  x      /  AST  17     /  ALT  14     /  AlkPhos  104    02 Jul 2017 06:07  TPro  7.2    /  Alb  3.0    /  TBili  0.4    /  DBili  x      /  AST  16     /  ALT  18     /  AlkPhos  116    01 Jul 2017 04:21  TPro  8.4    /  Alb  3.5    /  TBili  0.3    /  DBili  x      /  AST  18     /  ALT  20     /  AlkPhos  130    30 Jun 2017 10:46    PTT - ( 01 Jul 2017 11:29 )  PTT:74.4 sec      06-30 @ 10:46  Pro Bnp 370    07/03/17 - rhythm - sinus       RADIOLOGY/EKG:    IMPRESSION:    1. Metastatic disease to the lungs.    2. Patchy groundglass opacities within the bilateral lower lobes for   which pneumonia can be considered. Correlate clinically.    3. Suspicion of a metastatic lesion to the liver.      John Copeland       Summary - echocardiogram -      The mitral valve leaflets appear thin and normal.   Mild mitral regurgitation is present.   Fibrocalcific changes noted to the aortic valve leaflets with preserved   excursion.   Moderate (2+) tricuspid valve regurgitation is present.   Pulmonic valve not well seen, probably normal pulmonic valve function.   The left atrium is mildly dilated.   Endocardium is not well visualized, however, overall left ventricular   systolic function appears normal. Technically Difficult Study.   Estimated left ventricular ejection fraction is 70 %.   Normal appearing right atrium.   Normal appearing right ventricle structure and function.   All visualized extra cardiac structures appearsto be normal.   No evidence of pericardial effusion.     Signature     ----------------------------------------------------------------   Electronically signed by Tr Cleary MD(Interpreting   physician) on 07/02/2017 09:25 AM   ----------------------------------------------------------------

## 2017-07-03 NOTE — PHYSICAL THERAPY INITIAL EVALUATION ADULT - GENERAL OBSERVATIONS, REHAB EVAL
pt received/left sitting in bedside chair, wife at bedside. pt cooperative and without any complaints.

## 2017-07-03 NOTE — PROGRESS NOTE ADULT - SUBJECTIVE AND OBJECTIVE BOX
Patient is a 71y Male who reports no complaints overnight. Feels much better from pulm perspective.     REVIEW OF SYSTEMS:    CONSTITUTIONAL: No weakness, fevers or chills  RESPIRATORY: N+ cough,no wheezing, hemoptysis; No shortness of breath  CARDIOVASCULAR: No chest pain or palpitations  GENITOURINARY: No dysuria, frequency or hematuria  All other review of systems is negative unless indicated above.    MEDICATIONS  (STANDING):  finasteride 5 milliGRAM(s) Oral daily  tamsulosin 0.4 milliGRAM(s) Oral at bedtime  simvastatin 20 milliGRAM(s) Oral at bedtime  metoprolol succinate ER 25 milliGRAM(s) Oral daily  amLODIPine   Tablet 10 milliGRAM(s) Oral daily  pantoprazole    Tablet 40 milliGRAM(s) Oral before breakfast  cefepime  IVPB 2000 milliGRAM(s) IV Intermittent every 24 hours  cefepime  IVPB   IV Intermittent   docusate sodium 200 milliGRAM(s) Oral daily  apixaban 5 milliGRAM(s) Oral two times a day  lactobacillus acidophilus 2 Tablet(s) Oral two times a day with meals  latanoprost 0.005% Ophthalmic Solution 1 Drop(s) Both EYES at bedtime    MEDICATIONS  (PRN):  acetaminophen   Tablet 650 milliGRAM(s) Oral every 6 hours PRN For Temp greater than 38 C (100.4 F)  acetaminophen   Tablet. 650 milliGRAM(s) Oral every 6 hours PRN Mild Pain (1 - 3)  ondansetron Injectable 4 milliGRAM(s) IV Push every 6 hours PRN Nausea  senna 2 Tablet(s) Oral at bedtime PRN Constipation  zaleplon 5 milliGRAM(s) Oral at bedtime PRN Insomnia        T(C): , Max: 36.7 (07-02-17 @ 17:28)  T(F): , Max: 98 (07-02-17 @ 17:28)  HR: 65 (07-03-17 @ 10:40)  BP: 99/64 (07-03-17 @ 10:40)  BP(mean): --  RR: 18 (07-03-17 @ 10:40)  SpO2: 97% (07-03-17 @ 10:40)  Wt(kg): --    07-02 @ 07:01  -  07-03 @ 07:00  --------------------------------------------------------  IN: 0 mL / OUT: 600 mL / NET: -600 mL          PHYSICAL EXAM:    Constitutional: NAD, well-groomed, well-developed  HEENT: PERRLA, EOMI,  MMM  Neck: No LAD, No JVD  Respiratory: scatt ronchi  Cardiovascular: S1 and S2, RRR  Gastrointestinal: BS+, soft, NT/ND  Extremities: No peripheral edema  Neurological: A/O x 3, no focal deficits  Psychiatric: Normal mood, normal affect  : No Lau  Skin: No rashes  Access: Not applicable        LABS:                        7.8    9.4   )-----------( 158      ( 03 Jul 2017 05:23 )             23.9     03 Jul 2017 05:23    140    |  105    |  34     ----------------------------<  115    4.0     |  27     |  2.34   02 Jul 2017 06:07    139    |  102    |  29     ----------------------------<  104    4.2     |  27     |  2.38   01 Jul 2017 04:21    138    |  104    |  25     ----------------------------<  118    4.1     |  25     |  2.00   30 Jun 2017 10:46    136    |  104    |  31     ----------------------------<  162    4.5     |  26     |  2.53     Ca    8.5        03 Jul 2017 05:23  Ca    8.6        02 Jul 2017 06:07  Ca    8.4        01 Jul 2017 04:21  Ca    8.7        30 Jun 2017 10:46  Phos  3.6       03 Jul 2017 05:23  Phos  3.1       02 Jul 2017 06:07  Mg     2.2       03 Jul 2017 05:23  Mg     2.0       02 Jul 2017 06:07  Mg     1.8       01 Jul 2017 04:21    TPro  7.6    /  Alb  2.9    /  TBili  0.5    /  DBili  x      /  AST  17     /  ALT  14     /  AlkPhos  104    02 Jul 2017 06:07  TPro  7.2    /  Alb  3.0    /  TBili  0.4    /  DBili  x      /  AST  16     /  ALT  18     /  AlkPhos  116    01 Jul 2017 04:21  TPro  8.4    /  Alb  3.5    /  TBili  0.3    /  DBili  x      /  AST  18     /  ALT  20     /  AlkPhos  130    30 Jun 2017 10:46          Urine Studies:          RADIOLOGY & ADDITIONAL STUDIES:

## 2017-07-03 NOTE — PROGRESS NOTE ADULT - SUBJECTIVE AND OBJECTIVE BOX
70 y/o M PMHx significant for metastatic colon cancer (lung, liver) receiving chemotherapy ([facility]), hx of AFib/AFlutter, recent PE, DVT (on DOAC), HTN, HLD, and glaucoma admitted 6/30 w/ c/o progressively worsening intermittent episodes of shortness of breath over the last several weeks w/ abrupt worsening this morning. In ER, patient was hypoxic  to 86-88% which improved on NRB and then nasal cannula. P recently chemo last week. Reports occasional cough, denies any recent sick contacts, fevers, myalgias/arthralgias. Here, afebrile, normal wbc ct, CT chest with bilateral groundglass opacities in lower lobes ? PNA, was given IV vancomycin/cefepime d/t concern for infection.    occasional cough  feels better     MEDICATIONS  (STANDING):  finasteride 5 milliGRAM(s) Oral daily  tamsulosin 0.4 milliGRAM(s) Oral at bedtime  simvastatin 20 milliGRAM(s) Oral at bedtime  metoprolol succinate ER 25 milliGRAM(s) Oral daily  amLODIPine   Tablet 10 milliGRAM(s) Oral daily  pantoprazole    Tablet 40 milliGRAM(s) Oral before breakfast  cefepime  IVPB 2000 milliGRAM(s) IV Intermittent every 24 hours  cefepime  IVPB   IV Intermittent   docusate sodium 200 milliGRAM(s) Oral daily  apixaban 5 milliGRAM(s) Oral two times a day  lactobacillus acidophilus 2 Tablet(s) Oral two times a day with meals  latanoprost 0.005% Ophthalmic Solution 1 Drop(s) Both EYES at bedtime      Vital Signs Last 24 Hrs  T(C): 36.2 (03 Jul 2017 10:40), Max: 36.7 (02 Jul 2017 17:28)  T(F): 97.2 (03 Jul 2017 10:40), Max: 98 (02 Jul 2017 17:28)  HR: 65 (03 Jul 2017 10:40) (64 - 65)  BP: 99/64 (03 Jul 2017 10:40) (99/64 - 128/72)  BP(mean): --  RR: 18 (03 Jul 2017 10:40) (18 - 18)  SpO2: 97% (03 Jul 2017 10:40) (97% - 97%)      PE:  Constitutional: frail looking  HEENT: NC/AT, EOMI, PERRLA  Neck: supple  Back: no tenderness  Respiratory: decreased breath sounds  Cardiovascular: S1S2 regular, no murmurs  Abdomen: soft, not tender, not distended, positive BS  Genitourinary: deferred  Rectal: deferred  Musculoskeletal: no muscle tenderness, no joint swelling or tenderness  Extremities: no pedal edema  Neurological: no focal deficits  Skin: no rashes    Labs:                                   7.8    9.4   )-----------( 158      ( 03 Jul 2017 05:23 )             23.9     07-03    140  |  105  |  34<H>  ----------------------------<  115<H>  4.0   |  27  |  2.34<H>    Ca    8.5      03 Jul 2017 05:23  Phos  3.6     07-03  Mg     2.2     07-03    TPro  7.6  /  Alb  2.9<L>  /  TBili  0.5  /  DBili  x   /  AST  17  /  ALT  14  /  AlkPhos  104  07-02           Cultures:   Culture - Blood (07.01.17 @ 00:32)    Specimen Source: .Blood None    Culture Results:   No growth to date.      Culture - Blood (07.01.17 @ 00:32)    Specimen Source: .Blood None    Culture Results:   No growth to date.              Radiology: < from: CT Chest No Cont (06.30.17 @ 22:26) >  EXAM:  CT CHEST                            PROCEDURE DATE:  06/30/2017          INTERPRETATION:  .    CLINICAL INFORMATION: Shortness of breath. History of metastatic disease.    TECHNIQUE: Helical axial images of the chest were obtained from the   thoracic inlet to the adrenal glands without the administration of   intravenous contrast. Sagittal and coronal reformations were obtained   from the source data.     COMPARISON: Comparison made to a prior CT angiogram examination of the   chest dated 10/9/2016.     FINDINGS: There is no axillary adenopathy. Multiple subcentimeter sized   lymph nodes are notable within the mediastinum which are nonspecific.   There is no hilar lymphadenopathy.    The heart size is normal. The pericardium appears unremarkable. The   ventricular chambers appear slightly hypodense when compared to the   interventricular septum, for which anemia can be considered.  There are   atherosclerotic calcifications of the imaged coronary arteries, aorta,   and branch vessels. The imaged portions of the aorta are normal in   caliber. There is a Mediport overlying the right chest wall, with its tip   in the SVC.    The central airways are patent. Scattered rounded pulmonary nodules are   notable throughout both lungs, compatible with metastatic disease. A   representative nodule measures 7 mm within the left upper lobe. There is   a focal rounded patchy ground glass opacity within the superior segment   of the right lower lobe. A few patchy opacities are also notablewithin   the left lung base. These opacities and nodules are new when compared to   the prior CT examination.    The patient is status post partial left hepatectomy. There is a vague   rounded hypodense lesion within the left medial hepatic lobe which   measures up to 5.3 cm suspicious for metastatic disease. The patient is   status post infrarenal IVC filter placement. An exophytic right-sided   renal cyst is noted.    The patient is status post upper ventral abdominal hernia repair.    Multilevel degenerative changes are noted within the imaged potions of   the spine.    IMPRESSION:    1. Metastatic disease to the lungs.    2. Patchy groundglass opacities within the bilateral lower lobes for   which pneumonia can be considered. Correlate clinically.    3. Suspicion of a metastatic lesion to the liver.    < end of copied text >  < from: CT Head No Cont (06.30.17 @ 22:22) >    EXAM:  CT BRAIN                            PROCEDURE DATE:  06/30/2017        INTERPRETATION:  .    CLINICAL INFORMATION: History of metastatic lung cancer. Assess for   metastatic disease.    TECHNIQUE: Multiple axial CT images of the head were obtained without   contrast. Sagittal and coronal reconstructed images were acquired from   the source data.    COMPARISON: No prior CT studies of the brain are available for comparison   at this institution.    FINDINGS: There is no acute intracranial hemorrhage, mass effect, midline   shift, herniation, extra-axial fluid collection, or hydrocephalus.    There is diffuse cerebral volume loss with prominence of the sulci,   fissures, and cisternal spaces which is normal for the patient's age.   There is mild periventricular white matter hypoattenuation statistically   compatible with microvascular changes given calcific atherosclerotic   disease of the intracranial arteries.    The paranasal sinuses and mastoid air cells are clear. The calvariumis   intact. The imaged orbits are unremarkable.    IMPRESSION: No acute intracranial hemorrhage, mass effect, or midline   shift.    Evaluation for intracranial metastatic disease is limited on noncontrast   CT modality.    Consider further evaluation with a contrast-enhanced MRI examination of   the brain, if there are no clinical complications.    < end of copied text >      Advanced directives addressed: full resuscitation

## 2017-07-03 NOTE — DISCHARGE NOTE ADULT - MEDICATION SUMMARY - MEDICATIONS TO STOP TAKING
I will STOP taking the medications listed below when I get home from the hospital:    amiodarone 200 mg oral tablet  -- 1 tab(s) by mouth once a day

## 2017-07-03 NOTE — PROGRESS NOTE ADULT - SUBJECTIVE AND OBJECTIVE BOX
HPI:  70 y/o M PMHx significant for metastatic colon cancer (lung, liver) [place] [facility] [place] and has had liver resection x 2, then had POD and started trial at [facility] FOLFIRI +Reolysin, last tx last week, CT scan on 6/27/17 with POD in lung, now off trial due to POD, admitted 6/30 w/ c/o progressively worsening intermittent episodes of shortness of breath over the last several weeks w/ abrupt worsening this morning. In ER, patient was hypoxic  to 86-88% which improved on NRB and then nasal cannula. P recently chemo last week. Reports occasional cough, denies any recent sick contacts, fevers, myalgias/arthralgias. Here, afebrile, normal wbc ct, CT chest with bilateral groundglass opacities in lower lobes ? PNA, was given IV vancomycin/cefepime d/t concern for infection.    Patient has been in antibiotics of PNA and appears improved today, sitting in chair without oxygen, speaking in full sentences    97.9, 64, 117/63, 97%    NAD sitting in chair  Regular Rate  slightly Decreased breath sounds b/l  abd soft NT ND  no c/c/e    WBC 9.4, Hgb 7.8, Platelets 158  Creatinine 2.34, eGFR 27

## 2017-07-03 NOTE — PHYSICAL THERAPY INITIAL EVALUATION ADULT - PERTINENT HX OF CURRENT PROBLEM, REHAB EVAL
71M presents to the ED w/ c/o progressively worsening intermittent episodes of shortness of breath over the last several weeks w/ abrupt worsening the morning of admission.

## 2017-07-03 NOTE — DISCHARGE NOTE ADULT - INSTRUCTIONS
Maintain low salt diet. Try to avoid processed foods & refined carbohydrates. Choosing organic foods, especially animal products which have been open-range raised & grass-fed, will be beneficial.  Avoid farm-raised fish due to inappropriately high omega 6 content, which is inflammatory by nature.   Best to cook with polyunsaturated fats & medium chain triglycerides such as olive oil, palm kernel oil and coconut oil, on low heat, as they provide antiinflammatory benefit as well.

## 2017-07-03 NOTE — PROGRESS NOTE ADULT - ASSESSMENT
71M with hx metastatic colon cancer most recently on trial at [facility], now off trial due to POD in lungs, last chemo 1 week ago (FOLFIRI + Reolysin)    Patient admitted with PNA    Patient and wife inquiring about next tx options which are difficult to outline without path/mutation profile    If MSI-High, patient is a candidate for pembrolizumab immunotherapy in the office setting (not required to be on trial), otherwise standard options are regorafenib (there is a trial comparing standard vs low dose); there may be eligibility for other trials but that can only be determined with mutational status of tumor, would need records from his pathology reports from his doctor's office once reopened after holiday weekend'    Regarding apixaban in this patient, appreciate renal input, patient is at borderline for current apixaban dose, we can continue for now but if renal function worsens would either hold or dose reduce to 2.5 mg po BID (instead of current 5 mg po BID).  Cautiously continue and monitor for now    Thank you for allowing us to participate in the care of this patient

## 2017-07-03 NOTE — DISCHARGE NOTE ADULT - HOSPITAL COURSE
72 y/o M PMHx significant for metastatic colon cancer (lung, liver) receiving chemotherapy ([facility] trial), hx of AFib/AFlutter, recent PE, DVT (on DOAC), HTN, HLD, and glaucoma who presents to the ED w/ c/o progressively worsening intermittent episodes of shortness of breath over the last several weeks w/ abrupt worsening this morning. In triage the patient was hypoxic SaO2 was 86-88% which improved on NRB now on NC via 2L/min. Labs were notable for Hbg/Hct 8.5/24.6, INR 1.68, BUN/Cr 31/2.53, (was 20/1.73), TnI 0.097 -> 0.106. Of note the patient denies any symptoms of chest pain, or palpitations and recently completed his last course 1 week ago at [facility]. The patient denies any recent sick contacts, denies cough, fevers, myalgias/arthralgias.     7/1: No O/N events. Low-grade fever this AM, but feels much better overall. Discussed timing of DVT recurrence @ length, as well as concerns re: pulm toxicity d/t amiodarone. Remains on supplemental O2.     7/2: No O/N events. Feels significantly better. Off supplemental O2. Afebrile.   	    Physical Exam:  · Constitutional	detailed exam	  · Constitutional Details	respiratory distress	  · Respiratory Distress	mild	  · Eyes	detailed exam	  · Eyes Details	PERRL	  · ENMT	not examined	  · Neck	detailed exam	  · Neck Details	supple; no JVD	  · Breasts	not examined	  · Back	not examined	  · Respiratory	detailed exam	  · Respiratory Details	good air movement; clear to auscultation bilaterally	  · Cardiovascular	detailed exam	  · Cardiovascular Details	regular rate and rhythm	  · Cardiovascular Details	positive S1; positive S2	  · Gastrointestinal	detailed exam	  · GI Normal	soft; nontender; no distention	  · Genitourinary	patient refused	  · Rectal	patient refused	  · Extremities	detailed exam	  · Extremities Details	no clubbing; no cyanosis	  · Vascular	Equal and normal pulses (carotid, femoral, dorsalis pedis)	  · Neurological	Alert & oriented; no sensory, motor or coordination deficits, normal reflexes	  · Skin	No lesions; no rash	  · Lymph Nodes	No lymphadedenopathy	  · Musculoskeletal	No joint pain, swelling or deformity; no limitation of movement	    T(C): 36.2 (07-03-17 @ 10:40), Max: 36.7 (07-02-17 @ 17:28)  HR: 65 (07-03-17 @ 10:40) (64 - 65)  BP: 99/64 (07-03-17 @ 10:40) (99/64 - 128/72)  RR: 18 (07-03-17 @ 10:40) (18 - 18)  SpO2: 97% (07-03-17 @ 10:40) (97% - 97%)  Wt(kg): --             7.8    9.4   )-----------( 158      ( 03 Jul 2017 05:23 )             23.9     03 Jul 2017 05:23    140    |  105    |  34     ----------------------------<  115    4.0     |  27     |  2.34     Ca    8.5        03 Jul 2017 05:23  Phos  3.6       03 Jul 2017 05:23  Mg     2.2       03 Jul 2017 05:23    TPro  7.6    /  Alb  2.9    /  TBili  0.5    /  DBili  x      /  AST  17     /  ALT  14     /  AlkPhos  104    02 Jul 2017 06:07  LIVER FUNCTIONS - ( 02 Jul 2017 06:07 )  Alb: 2.9 g/dL / Pro: 7.6 gm/dL / ALK PHOS: 104 U/L / ALT: 14 U/L / AST: 17 U/L / GGT: x           Assessment and Plan:   Assessment:  		    ·  Sepsis with hypoxemic respiratory failure 2/2 community-acquired PNA, off supplemental oxygen, afebrile x 48hrs  - switch to Ceftin BID x 7 more days  - probiotics BID  - avoid sick contacts  - stay well-hydrated       ·   Elevated troponin, demand ischemia   - medical management    ·   ELIANA on CKD stage 3, resolved  - improve hydration  - avoid nephrotoxins  - adjust medications as required    · Anemia of chronic disease, stable   - f/u with Dr. Valdovinos for CBC monitoring      ·  chronic pulmonary embolism, DVT    - c/w Eliquis renally-dosed    ·	Hypercholesterolemia. Plan:   -cont. Simvastatin 20mg po qhs.      ·  HTN (hypertension).  Plan: ~cont. Amlodipine 10mg po daily.       ·  Problem: Afib.  Plan: ~in NSR  - c/w BB  - Eliquis renally-dosed       ·  Problem: Glaucoma of both eyes, unspecified glaucoma.  Plan: ~and Lumigan and Latanoprost.

## 2017-07-03 NOTE — DISCHARGE NOTE ADULT - MEDICATION SUMMARY - MEDICATIONS TO CHANGE
I will SWITCH the dose or number of times a day I take the medications listed below when I get home from the hospital:    Eliquis 5 mg oral tablet  -- 1 tab(s) by mouth 2 times a day

## 2017-07-03 NOTE — DISCHARGE NOTE ADULT - MEDICATION SUMMARY - MEDICATIONS TO TAKE
I will START or STAY ON the medications listed below when I get home from the hospital:    finasteride 5 mg oral tablet  -- 1 tab(s) by mouth once a day  -- Indication: For BPH    tamsulosin 0.4 mg oral capsule  -- 1 cap(s) by mouth once a day  -- Indication: For BPH    Eliquis 2.5 mg oral tablet  -- 1 tab(s) by mouth 2 times a day  -- Check with your doctor before becoming pregnant.  It is very important that you take or use this exactly as directed.  Do not skip doses or discontinue unless directed by your doctor.  Obtain medical advice before taking any non-prescription drugs as some may affect the action of this medication.    -- Indication: For Afib    simvastatin 20 mg oral tablet  -- 1 tab(s) by mouth once a day (at bedtime)  -- Indication: For HLD    zaleplon 5 mg oral capsule  -- 1 cap(s) by mouth once a day (at bedtime), As needed, Insomnia  -- Indication: For insomnia    metoprolol succinate 25 mg oral tablet, extended release  -- 1 tab(s) by mouth once a day  -- Indication: For HTN (hypertension)    amLODIPine 10 mg oral tablet  -- 1 tab(s) by mouth once a day  -- Indication: For HTN (hypertension)    Ceftin 250 mg oral tablet  -- 1 tab(s) by mouth 2 times a day  -- Finish all this medication unless otherwise directed by prescriber.  Medication should be taken with plenty of water.  Take with food or milk.    -- Indication: For Pneumonia    VSL#3 oral capsule  -- 1 cap(s) by mouth 2 times a day  -- It is very important that you take or use this exactly as directed.  Do not skip doses or discontinue unless directed by your doctor.  Keep in refrigerator.  Do not freeze.    -- Indication: For Gut health    omeprazole 40 mg oral delayed release capsule  -- 1 cap(s) by mouth once a day  -- Indication: For GERD, GI prophylaxis

## 2017-07-03 NOTE — PROGRESS NOTE ADULT - ASSESSMENT
72 y/o M PMHx significant for metastatic colon cancer (lung, liver) receiving chemotherapy ([facility] trial), hx of AFib/AFlutter, recent PE, DVT (on DOAC), HTN, HLD, and glaucoma admitted 6/30 w/ c/o progressively worsening intermittent episodes of shortness of breath over the last several weeks w/ abrupt worsening this morning. In ER, patient was hypoxic  to 86-88% which improved on NRB and then nasal cannula. P recently chemo last week. Reports occasional cough, denies any recent sick contacts, fevers, myalgias/arthralgias. Here, afebrile, normal wbc ct, CT chest with bilateral groundglass opacities in lower lobes ? PNA, was given IV vancomycin/cefepime d/t concern for infection.    1. hypoxic resp failure/hcap/metastatic colon ca/ELIANA on CKD    - improving    - conrtinue with IV cefepime 2gm q24h for now #4    - hold further vancomycin    - once improves in next 24-48 hours, can switch to oral ceftin 250mg BID to complete total 7-10 day course    - recent chemo and noted to have progression of cancer on recent imaging, oncology eval noted    - amio stopped d/t concern for pulm toxicity    - blood cx no growth    -monitor temps    - -tolerating abx well so far; no side effects noted    -reason for abx use and side effects reviewed with patient    2. other issues - metastatic colon cancer (lung, liver) receiving chemotherapy ([facility] trial), hx of AFib/AFlutter, recent PE, DVT (on DOAC), HTN, HLD, and glaucoma - care per medicine

## 2017-07-03 NOTE — DISCHARGE NOTE ADULT - CARE PROVIDER_API CALL
Waleska Cornejo), Internal Medicine  [address] [place] [address]  Phone: [phone]  Fax: [phone]

## 2017-07-03 NOTE — PROGRESS NOTE ADULT - ASSESSMENT
71 year old male presented to the hospital with a history of Lung Ca, HTN, HLD, arrythmia, CKD with baseline renal function near 2.0 mg/dl (chemo induced), BPH with renal evaluation of ELIANA on CKD in the setting of pneumonia. ELIANA was likely pre-renal , now stabilized and back to baseline. Baseline appears to be chemo induced, has history of urinary retention which may play a role in CKD as well.    CKD/ELIANA  -Renal function stable, base is low 2's per family.  -Ok to keep off of IVF, taking good po  -Renal imaging refused by patient apparently, with stalbe renal funtion and outpatient urology and nephrology ok to hold on imaging for now  -Will need close outpatient renal follow up with primary nephro  -D/c with patient need to f/u with renal/heme regarding eliquis and renal dosing, he may need alternate agent. D/c with him at length risks as well and he wants to speak with his primary team prior to making change.   -Defer AC to heme    Pneumonia  -ID  -ABX  -Follow up cultures    Anemia  -They do not report getting SHIRA in the past  -PRBC defer to medical team/Heme    D/c with Dr. CHEATHAM hematology

## 2017-07-03 NOTE — DISCHARGE NOTE ADULT - PATIENT PORTAL LINK FT
“You can access the Follow[facility] Patient Portal, offered by [facility], by registering with the following website: [email or web address]”

## 2017-07-03 NOTE — DISCHARGE NOTE ADULT - PLAN OF CARE
treat completely use Ceftin as prescribed twice daily for 7 more days  - stay well-hydrated  - use probiotic twice daily, preferably for life( VSL #3 is available on [employer] for much cheaper than local stores) prevent recurrence and complications - reduce Eliquis dose to 2.5mg twice daily in light of reduced kidney function  - stay active and elevate legs when seated, perform calf flexion when at rest for prolonged periods of time slow progression, seek experimental modalities - maintain excellent nutrition as outlined above  - continue to seek experimental clinical trials to potentially slow progression of cancer  - agree with use of medical marijuana for symptom reduction  - consider local followup with Dr. Waleska Cornejo

## 2017-07-06 LAB
CULTURE RESULTS: SIGNIFICANT CHANGE UP
CULTURE RESULTS: SIGNIFICANT CHANGE UP
SPECIMEN SOURCE: SIGNIFICANT CHANGE UP
SPECIMEN SOURCE: SIGNIFICANT CHANGE UP

## 2017-07-07 DIAGNOSIS — I48.92 UNSPECIFIED ATRIAL FLUTTER: ICD-10-CM

## 2017-07-07 DIAGNOSIS — Z86.718 PERSONAL HISTORY OF OTHER VENOUS THROMBOSIS AND EMBOLISM: ICD-10-CM

## 2017-07-07 DIAGNOSIS — C78.6 SECONDARY MALIGNANT NEOPLASM OF RETROPERITONEUM AND PERITONEUM: ICD-10-CM

## 2017-07-07 DIAGNOSIS — D63.0 ANEMIA IN NEOPLASTIC DISEASE: ICD-10-CM

## 2017-07-07 DIAGNOSIS — I24.8 OTHER FORMS OF ACUTE ISCHEMIC HEART DISEASE: ICD-10-CM

## 2017-07-07 DIAGNOSIS — N40.0 BENIGN PROSTATIC HYPERPLASIA WITHOUT LOWER URINARY TRACT SYMPTOMS: ICD-10-CM

## 2017-07-07 DIAGNOSIS — I48.0 PAROXYSMAL ATRIAL FIBRILLATION: ICD-10-CM

## 2017-07-07 DIAGNOSIS — Z90.49 ACQUIRED ABSENCE OF OTHER SPECIFIED PARTS OF DIGESTIVE TRACT: ICD-10-CM

## 2017-07-07 DIAGNOSIS — Z92.21 PERSONAL HISTORY OF ANTINEOPLASTIC CHEMOTHERAPY: ICD-10-CM

## 2017-07-07 DIAGNOSIS — E78.00 PURE HYPERCHOLESTEROLEMIA, UNSPECIFIED: ICD-10-CM

## 2017-07-07 DIAGNOSIS — Z99.2 DEPENDENCE ON RENAL DIALYSIS: ICD-10-CM

## 2017-07-07 DIAGNOSIS — H40.9 UNSPECIFIED GLAUCOMA: ICD-10-CM

## 2017-07-07 DIAGNOSIS — C78.00 SECONDARY MALIGNANT NEOPLASM OF UNSPECIFIED LUNG: ICD-10-CM

## 2017-07-07 DIAGNOSIS — C18.9 MALIGNANT NEOPLASM OF COLON, UNSPECIFIED: ICD-10-CM

## 2017-07-07 DIAGNOSIS — E78.5 HYPERLIPIDEMIA, UNSPECIFIED: ICD-10-CM

## 2017-07-07 DIAGNOSIS — I12.9 HYPERTENSIVE CHRONIC KIDNEY DISEASE WITH STAGE 1 THROUGH STAGE 4 CHRONIC KIDNEY DISEASE, OR UNSPECIFIED CHRONIC KIDNEY DISEASE: ICD-10-CM

## 2017-07-07 DIAGNOSIS — R06.02 SHORTNESS OF BREATH: ICD-10-CM

## 2017-07-07 DIAGNOSIS — D63.1 ANEMIA IN CHRONIC KIDNEY DISEASE: ICD-10-CM

## 2017-07-07 DIAGNOSIS — Z86.711 PERSONAL HISTORY OF PULMONARY EMBOLISM: ICD-10-CM

## 2017-07-07 DIAGNOSIS — D64.81 ANEMIA DUE TO ANTINEOPLASTIC CHEMOTHERAPY: ICD-10-CM

## 2017-07-07 DIAGNOSIS — J15.6 PNEUMONIA DUE TO OTHER GRAM-NEGATIVE BACTERIA: ICD-10-CM

## 2017-07-07 DIAGNOSIS — N18.3 CHRONIC KIDNEY DISEASE, STAGE 3 (MODERATE): ICD-10-CM

## 2017-07-07 DIAGNOSIS — C78.7 SECONDARY MALIGNANT NEOPLASM OF LIVER AND INTRAHEPATIC BILE DUCT: ICD-10-CM

## 2017-07-07 DIAGNOSIS — T45.1X5A ADVERSE EFFECT OF ANTINEOPLASTIC AND IMMUNOSUPPRESSIVE DRUGS, INITIAL ENCOUNTER: ICD-10-CM

## 2017-07-07 DIAGNOSIS — J96.91 RESPIRATORY FAILURE, UNSPECIFIED WITH HYPOXIA: ICD-10-CM

## 2017-07-07 DIAGNOSIS — Z79.01 LONG TERM (CURRENT) USE OF ANTICOAGULANTS: ICD-10-CM

## 2017-07-07 DIAGNOSIS — N17.9 ACUTE KIDNEY FAILURE, UNSPECIFIED: ICD-10-CM

## 2017-12-26 ENCOUNTER — RESULT REVIEW (OUTPATIENT)
Age: 72
End: 2017-12-26

## 2017-12-26 ENCOUNTER — OUTPATIENT (OUTPATIENT)
Dept: OUTPATIENT SERVICES | Facility: HOSPITAL | Age: 72
LOS: 1 days | Discharge: HOME | End: 2017-12-26
Payer: MEDICARE

## 2017-12-26 VITALS
SYSTOLIC BLOOD PRESSURE: 131 MMHG | TEMPERATURE: 98 F | RESPIRATION RATE: 16 BRPM | WEIGHT: 179.9 LBS | HEART RATE: 68 BPM | OXYGEN SATURATION: 100 % | HEIGHT: 67 IN | DIASTOLIC BLOOD PRESSURE: 91 MMHG

## 2017-12-26 VITALS
HEART RATE: 97 BPM | OXYGEN SATURATION: 99 % | RESPIRATION RATE: 16 BRPM | SYSTOLIC BLOOD PRESSURE: 134 MMHG | DIASTOLIC BLOOD PRESSURE: 84 MMHG | TEMPERATURE: 98 F

## 2017-12-26 DIAGNOSIS — Z90.49 ACQUIRED ABSENCE OF OTHER SPECIFIED PARTS OF DIGESTIVE TRACT: Chronic | ICD-10-CM

## 2017-12-26 DIAGNOSIS — Z85.05 PERSONAL HISTORY OF MALIGNANT NEOPLASM OF LIVER: Chronic | ICD-10-CM

## 2017-12-26 DIAGNOSIS — Z95.828 PRESENCE OF OTHER VASCULAR IMPLANTS AND GRAFTS: Chronic | ICD-10-CM

## 2017-12-26 LAB
ALBUMIN FLD-MCNC: 2.6 G/DL — SIGNIFICANT CHANGE UP
AMYLASE FLD-CCNC: 25 U/L — SIGNIFICANT CHANGE UP
AMYLASE P1 CFR SERPL: 46 U/L — SIGNIFICANT CHANGE UP (ref 25–115)
ANION GAP SERPL CALC-SCNC: 9 MMOL/L — SIGNIFICANT CHANGE UP (ref 5–17)
APTT BLD: 31.3 SEC — SIGNIFICANT CHANGE UP (ref 27.5–37.4)
B PERT IGG+IGM PNL SER: CLEAR — SIGNIFICANT CHANGE UP
BUN SERPL-MCNC: 26 MG/DL — HIGH (ref 7–23)
CALCIUM SERPL-MCNC: 8.8 MG/DL — SIGNIFICANT CHANGE UP (ref 8.5–10.1)
CHLORIDE SERPL-SCNC: 101 MMOL/L — SIGNIFICANT CHANGE UP (ref 96–108)
CO2 SERPL-SCNC: 26 MMOL/L — SIGNIFICANT CHANGE UP (ref 22–31)
COLOR FLD: YELLOW — SIGNIFICANT CHANGE UP
CREAT SERPL-MCNC: 1.35 MG/DL — HIGH (ref 0.5–1.3)
EOSINOPHIL # FLD: 3 % — SIGNIFICANT CHANGE UP
FLUID INTAKE SUBSTANCE CLASS: SIGNIFICANT CHANGE UP
FLUID SEGMENTED GRANULOCYTES: 9 % — SIGNIFICANT CHANGE UP
GLUCOSE FLD-MCNC: 137 MG/DL — SIGNIFICANT CHANGE UP
GLUCOSE SERPL-MCNC: 136 MG/DL — HIGH (ref 70–99)
GRAM STN FLD: SIGNIFICANT CHANGE UP
HCT VFR BLD CALC: 26.3 % — LOW (ref 39–50)
HGB BLD-MCNC: 8.1 G/DL — LOW (ref 13–17)
INR BLD: 1.87 RATIO — HIGH (ref 0.88–1.16)
LDH SERPL L TO P-CCNC: 123 U/L — SIGNIFICANT CHANGE UP
LDH SERPL L TO P-CCNC: 213 U/L — SIGNIFICANT CHANGE UP (ref 84–241)
LYMPHOCYTES # FLD: 59 % — SIGNIFICANT CHANGE UP
MCHC RBC-ENTMCNC: 24.3 PG — LOW (ref 27–34)
MCHC RBC-ENTMCNC: 30.7 GM/DL — LOW (ref 32–36)
MCV RBC AUTO: 79.1 FL — LOW (ref 80–100)
MESOTHL CELL # FLD: 5 % — SIGNIFICANT CHANGE UP
MONOS+MACROS # FLD: 22 % — SIGNIFICANT CHANGE UP
NIGHT BLUE STAIN TISS: SIGNIFICANT CHANGE UP
OTHER CELLS FLD MANUAL: 2 % — SIGNIFICANT CHANGE UP
PH FLD: 7.42 — SIGNIFICANT CHANGE UP
PLATELET # BLD AUTO: 274 K/UL — SIGNIFICANT CHANGE UP (ref 150–400)
POTASSIUM SERPL-MCNC: 3.9 MMOL/L — SIGNIFICANT CHANGE UP (ref 3.5–5.3)
POTASSIUM SERPL-SCNC: 3.9 MMOL/L — SIGNIFICANT CHANGE UP (ref 3.5–5.3)
PROT FLD-MCNC: 5.4 G/DL — SIGNIFICANT CHANGE UP
PROT SERPL-MCNC: 7.6 GM/DL — SIGNIFICANT CHANGE UP (ref 6–8.3)
PROTHROM AB SERPL-ACNC: 20.5 SEC — HIGH (ref 9.8–12.7)
RBC # BLD: 3.32 M/UL — LOW (ref 4.2–5.8)
RBC # FLD: 17.2 % — HIGH (ref 10.3–14.5)
RCV VOL RI: 1000 /UL — HIGH (ref 0–5)
SODIUM SERPL-SCNC: 136 MMOL/L — SIGNIFICANT CHANGE UP (ref 135–145)
SPECIMEN SOURCE FLD: SIGNIFICANT CHANGE UP
SPECIMEN SOURCE FLD: SIGNIFICANT CHANGE UP
SPECIMEN SOURCE: SIGNIFICANT CHANGE UP
SPECIMEN SOURCE: SIGNIFICANT CHANGE UP
TOTAL NUCLEATED CELL COUNT, BODY FLUID: 312 /UL — HIGH (ref 0–5)
TUBE TYPE: SIGNIFICANT CHANGE UP
WBC # BLD: 13.5 K/UL — HIGH (ref 3.8–10.5)
WBC # FLD AUTO: 13.5 K/UL — HIGH (ref 3.8–10.5)

## 2017-12-26 PROCEDURE — 88305 TISSUE EXAM BY PATHOLOGIST: CPT | Mod: 26

## 2017-12-26 PROCEDURE — 32555 ASPIRATE PLEURA W/ IMAGING: CPT | Mod: RT

## 2017-12-26 PROCEDURE — 71010: CPT | Mod: 26

## 2017-12-26 PROCEDURE — 88108 CYTOPATH CONCENTRATE TECH: CPT | Mod: 26

## 2017-12-26 NOTE — BRIEF OPERATIVE NOTE - PROCEDURE
<<-----Click on this checkbox to enter Procedure Thoracentesis by physician  12/26/2017  diag and therapeutic  Active  DAXELROD

## 2017-12-26 NOTE — ASU PATIENT PROFILE, ADULT - PRO ARRIVE FROM
Chief complaint:   Chief Complaint   Patient presents with   • Wheezing       Vitals:  Visit Vitals   • /75 (BP Location: [facility] [place], Patient Position: Sitting, Cuff Size: Regular)   • Pulse 92   • Temp 97.5 °F (36.4 °C) (Temporal Artery)   • Resp 20   • Ht 5' 4.25\" (1.632 m)   • Wt 74 kg   • SpO2 95%   • BMI 27.79 kg/m2       HISTORY OF PRESENT ILLNESS     HPI Comments: Patient is a 76-year-old  female who presents to the office today with concerns in regards to increased shortness of breath and wheezing. Patient was initially evaluated through the urgent care 4/22/2017. At that time she was diagnosed with an acute maxillary sinusitis/bronchitis. She was started on Omnicef 300 mrem one capsule p.o. b.i.d. on a prednisone taper dose. She was also given a rescue albuterol inhaler. Patient reports that she has noted improvement in regards to congestion; unfortunately, she has had exacerbated symptoms of wheezing and shortness of breath which started in earlier this morning, provoking patient come in today for evaluation. Patient denies completes of ear pain or pressure, she has not had any concerns in reference to sore throat. Patient reports that she has been afebrile.      Other significant problems:  Patient Active Problem List    Diagnosis Date Noted   • Mixed hyperlipidemia      Priority: Medium     Hyperlipidemia. The patient has been intolerant to statin as well as  Trilipix. She will continue to focus on diet and exercise, hoping to keep  these in check as well as potentially improve these values. Will repeat a  fasting lipid panel and ALT level in 1 year's time with routine subsequent  Medicare wellness exam.     • Mucoid cyst, joint 01/30/2017     Priority: Low     left hand, 4th digit DIP joint     • Strain of right knee and leg 06/08/2015     Priority: Low   • Benign paroxysmal positional vertigo 12/04/2014     Priority: Low   • Plantar fasciitis, left 07/22/2014     Priority: Low   • Unspecified  vitamin D deficiency 07/17/2013     Priority: Low     Vitamin D deficiency. Will repeat a vitamin D 25-OH level in 1 year's  time with routine labs. This was not repeated this year as of 06/2011 it  was well within normal limits but again patient has discontinued her  Vitamin D supplement.     • Other malignant neoplasm of skin, site unspecified 12/14/2009     Priority: Low     12/14/2009    Basal Cell    R upper lip   Mohs & cl Dr. Evans        • Above Right Lip--Basal Cell Carcinoma 12/09 12/07/2009     Priority: Low   • Mitral valve disorders      Priority: Low   • Reflux esophagitis      Priority: Low       PAST MEDICAL, FAMILY AND SOCIAL HISTORY     Medications:  Current Outpatient Prescriptions   Medication   • cefdinir (OMNICEF) 300 MG capsule   • predniSONE (DELTASONE) 10 MG tablet   • albuterol 108 (90 Base) MCG/ACT inhaler   • esomeprazole (NEXIUM) 40 MG capsule     No current facility-administered medications for this visit.        Allergies:  ALLERGIES:   Allergen Reactions   • Simvastatin MYALGIA     pain in forearms   • Statins    • Codeine GI UPSET   • Lovastatin Muscle Spasm     leg cramps       Past Medical  History/Surgeries:  Past Medical History:   Diagnosis Date   • Corneal edema     Dr Armando abernathy Pt    • GERD (gastroesophageal reflux disease)    • Hyperlipidemia, mixed    • Mitral valve disorders     Mitral Valve Prolapse (needs antibiotics for procedures)   • MULTIPLE SEBORRHEIC KERATOSES    • Other genital herpes    • Other malignant neoplasm of skin, site unspecified    • Pain in right ankle    • PONV (postoperative nausea and vomiting)     after colonoscopy   • Right leg pain    • Vitamin D deficiency        Past Surgical History:   Procedure Laterality Date   • BIOPSY OF BREAST, INCISIONAL  5/97    benign, R breast x 2   • COLONOSCOPY  09/12/2013    dr funez  - 10 yr recall   • COLONOSCOPY DIAGNOSTIC  9/03    Dr. Hoang - hyperplastic rectal polyp.   • DEXA BONE DENSITY AXIAL SKELETON   11/29/2010   • ESOPHAGOGASTRODUODENOSCOPY TRANSORAL FLEX W/BX SINGLE OR MULT  9/03    EGD with Bx - Dr. Hoang: gastric polyps(bx benign), o/w neg. Dr. Hoang recommended continuing ppi.   • MAMMO SCREENING BILATERAL  08/13/2009   • MOHS 1 STAGE UPTO5 TISSUE BLOCKS [facility]  12/09    Dr. Gitter Mohs, Basal Cell CA   • REMOVE TONSILS/ADENOIDS,<13 Y/O      T & A   • X-RAY COLON CONTRAST BARIUM ENEMA  5/2001    Normal       Family History:  Family History   Problem Relation Age of Onset   • Breast cancer Mother      breast cancer, dx'd at age 84   • Stroke Mother      occurred in her 80's   • Arthritis Father      bilateral knee replacements   • Cancer Father      prostate   • Arthritis Brother    • Arthritis Brother    • OCD Brother      lymes disease   • Arthritis Sister    • Stroke Maternal Grandmother    • Stroke Paternal Grandmother        Social History:  Social History   Substance Use Topics   • Smoking status: Never Smoker   • Smokeless tobacco: Never Used   • Alcohol use 0.6 oz/week     1 Standard drinks or equivalent per week      Comment: Rare       REVIEW OF SYSTEMS     Review of Systems   Constitutional: Positive for activity change, appetite change and fatigue. Negative for fever.   HENT: Positive for congestion and rhinorrhea. Negative for ear pain, postnasal drip, sinus pressure and sore throat.    Eyes: Negative.    Respiratory: Positive for choking, chest tightness, shortness of breath and wheezing.    Cardiovascular: Negative.    Gastrointestinal: Negative.    Endocrine: Negative.    Genitourinary: Negative.    Musculoskeletal: Negative.    Skin: Negative.    Allergic/Immunologic: Negative.    Neurological: Negative.    Hematological: Negative.    Psychiatric/Behavioral: Negative.        PHYSICAL EXAM     Physical Exam   Constitutional: She is oriented to person, place, and time. She appears well-developed and well-nourished. No distress.   HENT:   Head: Normocephalic and atraumatic.   Right Ear: External  ear normal.   Left Ear: External ear normal.   Nose: Nose normal.   Mouth/Throat: Oropharynx is clear and moist.   Eyes: Conjunctivae and EOM are normal. Pupils are equal, round, and reactive to light. No scleral icterus.   Neck: Normal range of motion. Neck supple. No thyromegaly present.   Cardiovascular: Normal rate, regular rhythm, normal heart sounds and intact distal pulses.    No murmur heard.  Pulmonary/Chest: Effort normal. No respiratory distress. She has wheezes. She has no rales.   On initial presentation patient's O2 saturation was 95% with inspiratory  Wheezes noted throughout bilateral upper and lower lobes. Patient was provided with a DuoNeb treatment. Her wheezing did clear on inspiration with a late residual expiratory wheeze noted throughout bilateral upper lobes. Clear throughout the bases. Patient did verbalize relief of shortness of breath and wheezing after treatment as well. O2 saturations on room air after treatment were 98%.   Abdominal: Soft. Bowel sounds are normal. There is no tenderness. There is no rebound and no guarding.   Musculoskeletal: Normal range of motion. She exhibits no edema.   Lymphadenopathy:     She has no cervical adenopathy.   Neurological: She is alert and oriented to person, place, and time. No cranial nerve deficit. Coordination normal.   Skin: Skin is warm and dry. No rash noted.   Psychiatric: She has a normal mood and affect. Her behavior is normal. Judgment and thought content normal.       ASSESSMENT/PLAN     Christine was seen today for wheezing.    Diagnoses and all orders for this visit:    Acute bronchitis, unspecified organism  -     albuterol-ipratropium 2.5 mg/0.5 mg (DUONEB) nebulizer solution 3 mL; Take 3 mLs by nebulization once.  -     tiotropium-olodaterol (STIOLTO RESPIMAT) 2.5-2.5 MCG/ACT inhaler; Inhale 2 puffs into the lungs daily. Lot # [id number] Exp 1/2018         -      patient is to complete antibiotic therapy that being Ceftin ear 300 mg one  tablet p.o. b.i.d. ×10 days and prednisone taper dose as prescribed 4/22/2017. In the event the patient has persistent symptoms over the course the next 5-7 days she should be seen for reevaluation with chest x-ray prior. Patient verbalized understanding was in agreement the above plan of care. All questions answered to her satisfaction she is discharged to home in stable condition.     home

## 2017-12-26 NOTE — ASU DISCHARGE PLAN (ADULT/PEDIATRIC). - NURSING INSTRUCTIONS
Review the multicolored Written Discharge Instruction sheet.  Call MD if pain medication not working.  Call MD if unable to urinate in 8 hours.

## 2017-12-26 NOTE — ASU PATIENT PROFILE, ADULT - PSH
H/O liver cancer  mets...resection 7/2013 and dec 2014  History of colon resection    S/P IVC filter

## 2017-12-28 LAB — NON-GYN CYTOLOGY SPEC: SIGNIFICANT CHANGE UP

## 2017-12-29 LAB
COMMENT - FLUIDS: SIGNIFICANT CHANGE UP

## 2018-01-01 LAB
CULTURE RESULTS: NO GROWTH — SIGNIFICANT CHANGE UP
SPECIMEN SOURCE: SIGNIFICANT CHANGE UP

## 2018-01-10 DIAGNOSIS — J90 PLEURAL EFFUSION, NOT ELSEWHERE CLASSIFIED: ICD-10-CM

## 2018-01-16 ENCOUNTER — EMERGENCY (EMERGENCY)
Facility: HOSPITAL | Age: 73
LOS: 0 days | Discharge: HOME | End: 2018-01-16
Attending: EMERGENCY MEDICINE | Admitting: EMERGENCY MEDICINE
Payer: MEDICARE

## 2018-01-16 VITALS
TEMPERATURE: 98 F | SYSTOLIC BLOOD PRESSURE: 103 MMHG | OXYGEN SATURATION: 97 % | HEART RATE: 90 BPM | DIASTOLIC BLOOD PRESSURE: 69 MMHG | RESPIRATION RATE: 20 BRPM

## 2018-01-16 VITALS — WEIGHT: 169.98 LBS | HEIGHT: 68 IN

## 2018-01-16 DIAGNOSIS — Z95.828 PRESENCE OF OTHER VASCULAR IMPLANTS AND GRAFTS: Chronic | ICD-10-CM

## 2018-01-16 DIAGNOSIS — E78.5 HYPERLIPIDEMIA, UNSPECIFIED: ICD-10-CM

## 2018-01-16 DIAGNOSIS — R06.02 SHORTNESS OF BREATH: ICD-10-CM

## 2018-01-16 DIAGNOSIS — Z98.890 OTHER SPECIFIED POSTPROCEDURAL STATES: ICD-10-CM

## 2018-01-16 DIAGNOSIS — Z86.711 PERSONAL HISTORY OF PULMONARY EMBOLISM: ICD-10-CM

## 2018-01-16 DIAGNOSIS — D64.9 ANEMIA, UNSPECIFIED: ICD-10-CM

## 2018-01-16 DIAGNOSIS — I10 ESSENTIAL (PRIMARY) HYPERTENSION: ICD-10-CM

## 2018-01-16 DIAGNOSIS — H40.9 UNSPECIFIED GLAUCOMA: ICD-10-CM

## 2018-01-16 DIAGNOSIS — Z95.810 PRESENCE OF AUTOMATIC (IMPLANTABLE) CARDIAC DEFIBRILLATOR: ICD-10-CM

## 2018-01-16 DIAGNOSIS — Z90.49 ACQUIRED ABSENCE OF OTHER SPECIFIED PARTS OF DIGESTIVE TRACT: Chronic | ICD-10-CM

## 2018-01-16 DIAGNOSIS — J90 PLEURAL EFFUSION, NOT ELSEWHERE CLASSIFIED: ICD-10-CM

## 2018-01-16 DIAGNOSIS — Z85.038 PERSONAL HISTORY OF OTHER MALIGNANT NEOPLASM OF LARGE INTESTINE: ICD-10-CM

## 2018-01-16 DIAGNOSIS — Z85.05 PERSONAL HISTORY OF MALIGNANT NEOPLASM OF LIVER: ICD-10-CM

## 2018-01-16 DIAGNOSIS — Z79.01 LONG TERM (CURRENT) USE OF ANTICOAGULANTS: ICD-10-CM

## 2018-01-16 DIAGNOSIS — Z85.05 PERSONAL HISTORY OF MALIGNANT NEOPLASM OF LIVER: Chronic | ICD-10-CM

## 2018-01-16 DIAGNOSIS — Z88.0 ALLERGY STATUS TO PENICILLIN: ICD-10-CM

## 2018-01-16 DIAGNOSIS — R94.31 ABNORMAL ELECTROCARDIOGRAM [ECG] [EKG]: ICD-10-CM

## 2018-01-16 LAB
ALBUMIN SERPL ELPH-MCNC: 2 G/DL — LOW (ref 3.3–5)
ALP SERPL-CCNC: 1048 U/L — HIGH (ref 40–120)
ALT FLD-CCNC: 122 U/L — HIGH (ref 12–78)
ANION GAP SERPL CALC-SCNC: 10 MMOL/L — SIGNIFICANT CHANGE UP (ref 5–17)
ANISOCYTOSIS BLD QL: SLIGHT — SIGNIFICANT CHANGE UP
APTT BLD: 32 SEC — SIGNIFICANT CHANGE UP (ref 27.5–37.4)
AST SERPL-CCNC: 237 U/L — HIGH (ref 15–37)
BASOPHILS # BLD AUTO: 0 K/UL — SIGNIFICANT CHANGE UP (ref 0–0.2)
BASOPHILS NFR BLD AUTO: 0.2 % — SIGNIFICANT CHANGE UP (ref 0–2)
BILIRUB SERPL-MCNC: 1.7 MG/DL — HIGH (ref 0.2–1.2)
BLD GP AB SCN SERPL QL: SIGNIFICANT CHANGE UP
BUN SERPL-MCNC: 57 MG/DL — HIGH (ref 7–23)
CALCIUM SERPL-MCNC: 9 MG/DL — SIGNIFICANT CHANGE UP (ref 8.5–10.1)
CHLORIDE SERPL-SCNC: 100 MMOL/L — SIGNIFICANT CHANGE UP (ref 96–108)
CO2 SERPL-SCNC: 25 MMOL/L — SIGNIFICANT CHANGE UP (ref 22–31)
CREAT SERPL-MCNC: 1.88 MG/DL — HIGH (ref 0.5–1.3)
DACRYOCYTES BLD QL SMEAR: SLIGHT — SIGNIFICANT CHANGE UP
EOSINOPHIL # BLD AUTO: 0 K/UL — SIGNIFICANT CHANGE UP (ref 0–0.5)
EOSINOPHIL NFR BLD AUTO: 0.1 % — SIGNIFICANT CHANGE UP (ref 0–6)
GIANT PLATELETS BLD QL SMEAR: PRESENT — SIGNIFICANT CHANGE UP
GLUCOSE SERPL-MCNC: 192 MG/DL — HIGH (ref 70–99)
HCT VFR BLD CALC: 22.4 % — LOW (ref 39–50)
HGB BLD-MCNC: 7.1 G/DL — LOW (ref 13–17)
HYPOCHROMIA BLD QL: SLIGHT — SIGNIFICANT CHANGE UP
INR BLD: 2.44 RATIO — HIGH (ref 0.88–1.16)
LYMPHOCYTES # BLD AUTO: 1.3 K/UL — SIGNIFICANT CHANGE UP (ref 1–3.3)
LYMPHOCYTES # BLD AUTO: 8 % — LOW (ref 13–44)
MACROCYTES BLD QL: SLIGHT — SIGNIFICANT CHANGE UP
MANUAL DIF COMMENT BLD-IMP: SIGNIFICANT CHANGE UP
MCHC RBC-ENTMCNC: 25.7 PG — LOW (ref 27–34)
MCHC RBC-ENTMCNC: 31.7 GM/DL — LOW (ref 32–36)
MCV RBC AUTO: 81.2 FL — SIGNIFICANT CHANGE UP (ref 80–100)
MICROCYTES BLD QL: SLIGHT — SIGNIFICANT CHANGE UP
MONOCYTES # BLD AUTO: 0.6 K/UL — SIGNIFICANT CHANGE UP (ref 0–0.9)
MONOCYTES NFR BLD AUTO: 3.3 % — SIGNIFICANT CHANGE UP (ref 2–14)
NEUTROPHILS # BLD AUTO: 14.6 K/UL — HIGH (ref 1.8–7.4)
NEUTROPHILS NFR BLD AUTO: 88.4 % — HIGH (ref 43–77)
PLAT MORPH BLD: NORMAL — SIGNIFICANT CHANGE UP
PLATELET # BLD AUTO: 276 K/UL — SIGNIFICANT CHANGE UP (ref 150–400)
POIKILOCYTOSIS BLD QL AUTO: SLIGHT — SIGNIFICANT CHANGE UP
POLYCHROMASIA BLD QL SMEAR: SLIGHT — SIGNIFICANT CHANGE UP
POTASSIUM SERPL-MCNC: 4.5 MMOL/L — SIGNIFICANT CHANGE UP (ref 3.5–5.3)
POTASSIUM SERPL-SCNC: 4.5 MMOL/L — SIGNIFICANT CHANGE UP (ref 3.5–5.3)
PROT SERPL-MCNC: 7 GM/DL — SIGNIFICANT CHANGE UP (ref 6–8.3)
PROTHROM AB SERPL-ACNC: 26.8 SEC — HIGH (ref 9.8–12.7)
RAPID RVP RESULT: SIGNIFICANT CHANGE UP
RBC # BLD: 2.75 M/UL — LOW (ref 4.2–5.8)
RBC # FLD: 20.1 % — HIGH (ref 10.3–14.5)
RBC BLD AUTO: (no result)
SODIUM SERPL-SCNC: 135 MMOL/L — SIGNIFICANT CHANGE UP (ref 135–145)
TYPE + AB SCN PNL BLD: SIGNIFICANT CHANGE UP
WBC # BLD: 16.5 K/UL — HIGH (ref 3.8–10.5)
WBC # FLD AUTO: 16.5 K/UL — HIGH (ref 3.8–10.5)

## 2018-01-16 PROCEDURE — 93010 ELECTROCARDIOGRAM REPORT: CPT

## 2018-01-16 PROCEDURE — 71045 X-RAY EXAM CHEST 1 VIEW: CPT | Mod: 26

## 2018-01-16 PROCEDURE — 99285 EMERGENCY DEPT VISIT HI MDM: CPT

## 2018-01-16 NOTE — ED ADULT TRIAGE NOTE - CHIEF COMPLAINT QUOTE
Pt sent in by Dr. Cornejo sent pt in for low hgb, pleural effusions. Pt c/o difficulty breathing, denies chest pain. Pt has a hx of colon ca. Pt also c/o bl leg swelling.

## 2018-01-16 NOTE — ED PROVIDER NOTE - OBJECTIVE STATEMENT
73 y/o male with PMHx of colon cancer (dx Dec 2010), HTN, HLD presents to the ED for evaluation of low hemoglobin (7.7) by Dr. Cornejo, oncology. +SOB x1 week with pleural effusions. Pt has had pleural effusions twice before, last drainage 1.5 weeks ago. Pt to undergo new trial of chemotherapy soon and needs low hemoglobin and pleural effusions to be treated prior to starting. Pt has already undergone 2 resections and has completed 2 trials of chemotherapy with the last one ended last week in [place]. No CP, no fevers, N/V/D. +Constipation (side effect of chemotherapy). +Coughing. Pt has known clots in his legs and in the lungs, currently taking Eliquis. Pulmonologist Dr. Mccann. PMD Dr. Tono Valdovinos. 71 y/o male with PMHx of colon cancer (dx Dec 2010), HTN, HLD presents to the ED for evaluation of low hemoglobin (7.3) by Dr. Cornejo, oncology. +SOB x1 week with pleural effusions. Pt has had pleural effusions twice before, last drainage 1.5 weeks ago. Pt to undergo new trial of chemotherapy soon and needs low hemoglobin and pleural effusions to be treated prior to starting. Pt has already undergone 2 resections and liver resection (colon ca spread to liver)  and has completed 2 trials of chemotherapy with the last one ended last week in [place]. No CP, no fevers, N/V/D. +Constipation (side effect of chemotherapy). +chronic LE edema bilaterally, +Coughing. Pt has known clots in his legs and in the lungs, currently taking Eliquis. Pulmonologist Dr. Mccann. PMD Dr. Tono Valdovinos.

## 2018-01-16 NOTE — ED PROVIDER NOTE - ATTENDING CONTRIBUTION TO CARE
I, Janny Dooley MD, personally saw the patient with the resident, and completed the key components of the history and physical exam. I then discussed the management plan with the resident.

## 2018-01-16 NOTE — ED ADULT NURSE REASSESSMENT NOTE - NS ED NURSE REASSESS COMMENT FT1
Care of pt received from Nicole Perry RN, blood products completed. Pt re-evaluated by MD Dooley, pt to be discharged. Pt and wife aware as to plan for discharge and have no questions at this time.

## 2018-01-16 NOTE — ED PROVIDER NOTE - MUSCULOSKELETAL, MLM
Spine appears normal, range of motion is not limited, no muscle or joint tenderness. +B/L LE edema to the knees

## 2018-01-16 NOTE — ED PROVIDER NOTE - PROGRESS NOTE DETAILS
d/w wife, daughter (an MD in [place]) and Oncologist here - agree with plan of best attempt not to admit pt to hospital. Pt. needs 1unit prbcs. then reassess resp stability for  d/c home.  Outpt thoracentesis will be scheduled by oncologist (pt. on eliquis and has to wait 2 days for procedure). MD Christiano pt feeling much better. ate dinner. feels less SOB. s/p 1unit prbc, lungs remain CTAB,just slight dec bs at right base,, pox 98% on RA. DC home. MD Yobani

## 2018-01-16 NOTE — ED PROVIDER NOTE - CONSTITUTIONAL, MLM
normal... Pale, chronically ill appearing, well nourished, awake, alert, oriented to person, place, time/situation and in no apparent distress.

## 2018-01-16 NOTE — ED ADULT NURSE NOTE - OBJECTIVE STATEMENT
Pt with h/o colon CA sent in by oncologist for low H/H and increasing chronic pleural effusion. Pt with increasing SOB, 95% on RA, placed on 2L via NC to 100%.  Pt with Right ACW infusaport, not accessed.  B/L 2-3+ LE worsening edema.  Chronic DVTs on eliquis, did not take this AM. Pt pale and c/o weakness.

## 2018-01-16 NOTE — ED PROVIDER NOTE - CARE PLAN
Principal Discharge DX:	Anemia  Secondary Diagnosis:	Colon cancer  Secondary Diagnosis:	Pleural effusion

## 2018-01-18 ENCOUNTER — OUTPATIENT (OUTPATIENT)
Dept: OUTPATIENT SERVICES | Facility: HOSPITAL | Age: 73
LOS: 1 days | Discharge: HOME | End: 2018-01-18
Payer: MEDICARE

## 2018-01-18 VITALS
OXYGEN SATURATION: 100 % | RESPIRATION RATE: 16 BRPM | SYSTOLIC BLOOD PRESSURE: 126 MMHG | HEART RATE: 97 BPM | DIASTOLIC BLOOD PRESSURE: 79 MMHG | TEMPERATURE: 98 F | WEIGHT: 169.98 LBS | HEIGHT: 68 IN

## 2018-01-18 VITALS
DIASTOLIC BLOOD PRESSURE: 72 MMHG | OXYGEN SATURATION: 95 % | HEART RATE: 63 BPM | RESPIRATION RATE: 18 BRPM | SYSTOLIC BLOOD PRESSURE: 104 MMHG

## 2018-01-18 DIAGNOSIS — J90 PLEURAL EFFUSION, NOT ELSEWHERE CLASSIFIED: ICD-10-CM

## 2018-01-18 DIAGNOSIS — Z95.828 PRESENCE OF OTHER VASCULAR IMPLANTS AND GRAFTS: Chronic | ICD-10-CM

## 2018-01-18 DIAGNOSIS — Z90.49 ACQUIRED ABSENCE OF OTHER SPECIFIED PARTS OF DIGESTIVE TRACT: Chronic | ICD-10-CM

## 2018-01-18 DIAGNOSIS — Z85.05 PERSONAL HISTORY OF MALIGNANT NEOPLASM OF LIVER: Chronic | ICD-10-CM

## 2018-01-18 DIAGNOSIS — E80.6 OTHER DISORDERS OF BILIRUBIN METABOLISM: ICD-10-CM

## 2018-01-18 LAB
ANION GAP SERPL CALC-SCNC: 8 MMOL/L — SIGNIFICANT CHANGE UP (ref 5–17)
BUN SERPL-MCNC: 45 MG/DL — HIGH (ref 7–23)
CALCIUM SERPL-MCNC: 8.8 MG/DL — SIGNIFICANT CHANGE UP (ref 8.5–10.1)
CHLORIDE SERPL-SCNC: 100 MMOL/L — SIGNIFICANT CHANGE UP (ref 96–108)
CO2 SERPL-SCNC: 24 MMOL/L — SIGNIFICANT CHANGE UP (ref 22–31)
CREAT SERPL-MCNC: 1.85 MG/DL — HIGH (ref 0.5–1.3)
GLUCOSE SERPL-MCNC: 232 MG/DL — HIGH (ref 70–99)
HCT VFR BLD CALC: 23.9 % — LOW (ref 39–50)
HGB BLD-MCNC: 7.2 G/DL — LOW (ref 13–17)
INR BLD: 1.88 RATIO — HIGH (ref 0.88–1.16)
MCHC RBC-ENTMCNC: 24.9 PG — LOW (ref 27–34)
MCHC RBC-ENTMCNC: 30.1 GM/DL — LOW (ref 32–36)
MCV RBC AUTO: 82.7 FL — SIGNIFICANT CHANGE UP (ref 80–100)
PLATELET # BLD AUTO: 284 K/UL — SIGNIFICANT CHANGE UP (ref 150–400)
POTASSIUM SERPL-MCNC: 4.5 MMOL/L — SIGNIFICANT CHANGE UP (ref 3.5–5.3)
POTASSIUM SERPL-SCNC: 4.5 MMOL/L — SIGNIFICANT CHANGE UP (ref 3.5–5.3)
PROTHROM AB SERPL-ACNC: 20.6 SEC — HIGH (ref 9.8–12.7)
RBC # BLD: 2.89 M/UL — LOW (ref 4.2–5.8)
RBC # FLD: 21 % — HIGH (ref 10.3–14.5)
SODIUM SERPL-SCNC: 132 MMOL/L — LOW (ref 135–145)
WBC # BLD: 13.8 K/UL — HIGH (ref 3.8–10.5)
WBC # FLD AUTO: 13.8 K/UL — HIGH (ref 3.8–10.5)

## 2018-01-18 PROCEDURE — 32555 ASPIRATE PLEURA W/ IMAGING: CPT | Mod: RT

## 2018-01-18 PROCEDURE — 93010 ELECTROCARDIOGRAM REPORT: CPT

## 2018-01-18 PROCEDURE — 71045 X-RAY EXAM CHEST 1 VIEW: CPT | Mod: 26

## 2018-01-18 PROCEDURE — 76700 US EXAM ABDOM COMPLETE: CPT | Mod: 26

## 2018-01-18 RX ORDER — FINASTERIDE 5 MG/1
1 TABLET, FILM COATED ORAL
Qty: 0 | Refills: 0 | COMMUNITY

## 2018-01-18 RX ORDER — AMLODIPINE BESYLATE 2.5 MG/1
1 TABLET ORAL
Qty: 0 | Refills: 0 | COMMUNITY

## 2018-01-18 RX ORDER — OMEPRAZOLE 10 MG/1
1 CAPSULE, DELAYED RELEASE ORAL
Qty: 0 | Refills: 0 | COMMUNITY

## 2018-01-18 RX ORDER — TAMSULOSIN HYDROCHLORIDE 0.4 MG/1
1 CAPSULE ORAL
Qty: 0 | Refills: 0 | COMMUNITY

## 2018-01-18 RX ORDER — SIMVASTATIN 20 MG/1
1 TABLET, FILM COATED ORAL
Qty: 0 | Refills: 0 | COMMUNITY

## 2018-01-18 NOTE — ASU DISCHARGE PLAN (ADULT/PEDIATRIC). - NOTIFY
Bleeding that does not stop/Numbness, color, or temperature change to extremity/Swelling that continues

## 2018-01-18 NOTE — BRIEF OPERATIVE NOTE - PROCEDURE
<<-----Click on this checkbox to enter Procedure Thoracentesis, with US guidance  01/18/2018    Active  DEBI

## 2018-01-18 NOTE — BRIEF OPERATIVE NOTE - OPERATION/FINDINGS
1) R pleural effusion  2) Access of R pleural space with 5F Yueh  3) 1500cc clear yellow pleural fluid aspirated  4) No PTX on post CXR

## 2018-01-24 LAB
CULTURE RESULTS: SIGNIFICANT CHANGE UP
SPECIMEN SOURCE: SIGNIFICANT CHANGE UP

## 2018-02-07 LAB — NIGHT BLUE STAIN TISS: SIGNIFICANT CHANGE UP

## 2018-02-14 LAB
CULTURE RESULTS: SIGNIFICANT CHANGE UP
SPECIMEN SOURCE: SIGNIFICANT CHANGE UP

## 2018-02-28 NOTE — DISCHARGE NOTE ADULT - PATIENT PORTAL LINK FT
“You can access the [facility] Patient Portal, offered by [facility], by registering with the following website: [email or web address]”
home

## 2018-06-13 NOTE — ASU PREOP CHECKLIST - HOW ADMINISTERED
See MAR for last dose taken
Venessa Briseno), Hematology; Internal Medicine; Medical Oncology  [address] [place] [id number]  Phone: [phone]  Fax: [phone]    Kashif Henry), Internal Medicine; Rheumatology  [address] [address]  Phone: [phone]  Fax: [phone]    Carlos Stephen), Family Medicine  [address] [place] [address] [address]  [place] [address]  Phone: [phone]  Fax: [phone]

## 2018-12-10 NOTE — ED ADULT TRIAGE NOTE - HEIGHT IN FEET
Martita Burden received radiation therapy treatment today 12/10/18.    Adria Montemayor  December 10, 2018  11:45 AM     5

## 2019-03-26 NOTE — PROGRESS NOTE ADULT - PROBLEM/PLAN-1
[facility] - 4tray.  Pharmacy states the trays are no longer avilable - ok for pens?     
On Thursday  
Patient called and advised that he should resume Metformin on Thursday.   
Received call from patient that he had the CT scan this morning and was told to not take his Metformin today and was questioning when to start it back.    PLEASE ADVISE  
DISPLAY PLAN FREE TEXT

## 2019-05-08 NOTE — PROGRESS NOTE ADULT - ASSESSMENT
Problem/Recommendation - 1:  Problem: SOB (shortness of breath). Recommendation: SOB has improved; likely multifactorial but as noted previously, do  not suspect a significant cardiac etiology.   TTE (7/1): No pericardial effusion, grossly normal LV and RV function. + wheeze and rhonchi on PE with chest CT as noted. Further treatment as per hospitalist.     Problem/Recommendation - 2:  ·  Problem: Paroxysmal atrial fibrillation.  Recommendation: Maintaining sinus rhythm; Amiodarone has been discontinued. ; continue anticoagulation to maintain therapeutic INR.     Problem/Recommendation - 3:  ·  Problem: Essential hypertension.  Recommendation: BP is controlled.     Problem/Recommendation - 4:  ·  Problem: Elevated troponin.  Recommendation: Minimally elevated serum troponin;  As previously noted, ACS not suspected - related to hypoxia, renal failure. Graft Donor Site Bandage (Optional-Leave Blank If You Don't Want In Note): Steri-strips and a pressure bandage were applied to the donor site.

## 2019-10-29 NOTE — ED ADULT TRIAGE NOTE - HEIGHT IN CM
Clinical:  Lower abdominal pain.

 

Technique:  Axial contrast enhanced images from the lung bases to the pubic

symphysis using oral (per protocol) and 100 ml Isovue 370 intravenous contrast

material with coronal and sagittal re-formations.

 

Comparison:  10/01/2019.

 

Findings:

Lung bases are clear.  Visualized heart and pericardium normal.

 

Liver, spleen, pancreas, gallbladder, bilateral adrenal glands and kidneys are

normal.  There is no evidence for bowel obstruction.  Previously noted sigmoid

diverticulitis has resolved although a mild residual colitis cannot be excluded.

Normal terminal ileum and appendix identified in the right lower quadrant.

Pelvis demonstrates normal bladder and mildly prominent heterogeneous prostate

gland.  No ascites.  No free air.  No adenopathy.  Abdominal aorta without

aneurysm or dissection.  Musculoskeletal structures are intact.

 

Impression:

1.  Previously noted sigmoid diverticulitis has resolved although a mild residual

colitis cannot be excluded.

2.  Heterogeneous prominent prostate gland.

3.  No further acute abdominopelvic pathology appreciated.

4.  No ascites, focal inflammatory stranding, or adenopathy.  No free air.

 

 

Electronically Signed by

Julien Sinha MD 10/29/2019 02:53 P
172.72

## 2020-11-10 NOTE — PATIENT PROFILE ADULT. - FUNCTIONAL SCREEN CURRENT LEVEL: AMBULATION, MLM
Anesthesia Type: 1% lidocaine with epinephrine Removed With: scissors Consent: Written consent obtained and the risks of skin tag removal was reviewed with the patient including but not limited to bleeding, pigmentary change, infection, pain, and remote possibility of scarring. Price (Use Numbers Only, No Special Characters Or $): 100 Anesthesia Volume In Cc: 3 Hemostasis: Aluminum Chloride and Electrocautery Detail Level: Detailed (0) independent

## 2021-10-26 NOTE — PATIENT PROFILE ADULT. - PHONE #
Clinical Pharmacy Note    Warfarin consult follow-up    Recent Labs     10/26/21  0147   INR 2.10*     Recent Labs     10/25/21  1310 10/26/21  0147   HGB 11.3* 10.7*   HCT 34.0* 31.3*    231       Significant Drug-Drug Interactions:  Current warfarin drug-drug interactions:   Cefepime - Cephalosporins may enhance the anticoagulant effect of Vitamin K Antagonists. Monitor for elevated INR and bleeding if a vitamin K antagonist is used in combination with cephalosporins. Discontinued drug-drug interactions: None    Date INR Warfarin Dose   10/25/21 1.72 15 mg   10/26/21 2.10 10 mg                                            Notes:  Give warfarin 10 mg po x 1 tonight                     Daily PT/INR until stable within therapeutic range.      Electronically signed by Sudeep Bacon, [address] [facility] on 10/26/2021 at 1:32 PM [phone]

## 2023-08-18 NOTE — ED ADULT NURSE NOTE - ED STAT RN HANDOFF DETAILS
Spoke to patient. Rescheduled his appt with Dr. Hogue per his request.     ----- Message from Cheire Hager sent at 8/18/2023  4:36 PM CDT -----  Type: Needs Medical Advice  Who Called:  Patient   Symptoms (please be specific):    How long has patient had these symptoms:    Pharmacy name and phone #:    Best Call Back Number: [phone]  Additional Information: Patient is requesting a call back to schedule an appt..        Jared ROMERO

## 2025-04-25 NOTE — H&P ADULT - PMH
Colon cancer    Glaucoma of both eyes, unspecified glaucoma    HTN (hypertension)    Hypercholesterolemia    Other chronic pulmonary embolism (V5) oriented